# Patient Record
Sex: FEMALE | Race: BLACK OR AFRICAN AMERICAN | NOT HISPANIC OR LATINO | Employment: STUDENT | URBAN - METROPOLITAN AREA
[De-identification: names, ages, dates, MRNs, and addresses within clinical notes are randomized per-mention and may not be internally consistent; named-entity substitution may affect disease eponyms.]

---

## 2019-03-06 ENCOUNTER — OFFICE VISIT (OUTPATIENT)
Dept: INTERNAL MEDICINE | Age: 19
End: 2019-03-06

## 2019-03-06 ENCOUNTER — TELEPHONE (OUTPATIENT)
Dept: SCHEDULING | Age: 19
End: 2019-03-06

## 2019-03-06 DIAGNOSIS — H57.89 IRRITATION OF LEFT EYE: Primary | ICD-10-CM

## 2019-03-06 PROBLEM — Z00.00 PHYSICAL EXAM, ROUTINE: Status: ACTIVE | Noted: 2019-03-06

## 2019-03-06 PROCEDURE — 99203 OFFICE O/P NEW LOW 30 MIN: CPT | Performed by: INTERNAL MEDICINE

## 2019-03-06 SDOH — HEALTH STABILITY: MENTAL HEALTH: HOW OFTEN DO YOU HAVE A DRINK CONTAINING ALCOHOL?: NEVER

## 2019-03-06 ASSESSMENT — ENCOUNTER SYMPTOMS
COUGH: 0
BRUISES/BLEEDS EASILY: 0
WOUND: 0
FEVER: 0
EYE PAIN: 0
POLYDIPSIA: 0
FATIGUE: 0
UNEXPECTED WEIGHT CHANGE: 0
SLEEP DISTURBANCE: 0
DIARRHEA: 0
APPETITE CHANGE: 0
CONSTIPATION: 0
VOMITING: 0
DIZZINESS: 0
NAUSEA: 0
BLOOD IN STOOL: 0
HEADACHES: 0
SHORTNESS OF BREATH: 0
ABDOMINAL PAIN: 0
BACK PAIN: 0
NERVOUS/ANXIOUS: 0
RHINORRHEA: 0

## 2019-03-06 ASSESSMENT — PAIN SCALES - GENERAL: PAINLEVEL: 0

## 2019-08-30 ENCOUNTER — HOSPITAL (OUTPATIENT)
Dept: OTHER | Age: 19
End: 2019-08-30
Attending: EMERGENCY MEDICINE

## 2019-08-30 ENCOUNTER — TELEPHONE (OUTPATIENT)
Dept: SCHEDULING | Age: 19
End: 2019-08-30

## 2019-08-30 LAB
ANALYZER ANC (IANC): ABNORMAL
BASO+EOS+MONOS # BLD: 0.4 K/MCL (ref 0.1–1.1)
BASO+EOS+MONOS NFR BLD: 11 %
DIFFERENTIAL METHOD BLD: ABNORMAL
ERYTHROCYTE [DISTWIDTH] IN BLOOD: 11.9 % (ref 11–15)
HCT VFR BLD CALC: 35.8 % (ref 36–46.5)
HETEROPH AB SERPL QL IA: POSITIVE
HGB BLD-MCNC: 12.2 G/DL (ref 12–15.5)
LYMPHOCYTES # BLD: 1.4 K/MCL (ref 1.2–5.2)
LYMPHOCYTES NFR BLD: 40 %
MCH RBC QN AUTO: 27.6 PG (ref 26–34)
MCHC RBC AUTO-ENTMCNC: 34.1 G/DL (ref 32–36.5)
MCV RBC AUTO: 81 FL (ref 78–100)
NEUTROPHILS # BLD: 1.8 K/MCL (ref 1.8–8)
NEUTROPHILS NFR BLD: 49 %
NEUTS SEG NFR BLD: ABNORMAL %
NRBC (NRBCRE): ABNORMAL
PLATELET # BLD: 187 K/MCL (ref 140–450)
RBC # BLD: 4.42 MIL/MCL (ref 4–5.2)
WBC # BLD: 3.6 K/MCL (ref 4.2–11)

## 2019-09-02 ENCOUNTER — TELEPHONE (OUTPATIENT)
Dept: SCHEDULING | Age: 19
End: 2019-09-02

## 2019-09-04 ENCOUNTER — OFFICE VISIT (OUTPATIENT)
Dept: INTERNAL MEDICINE | Age: 19
End: 2019-09-04

## 2019-09-04 DIAGNOSIS — B34.9 VIRAL SYNDROME: Primary | ICD-10-CM

## 2019-09-04 PROBLEM — Z00.00 PHYSICAL EXAM, ROUTINE: Status: RESOLVED | Noted: 2019-03-06 | Resolved: 2019-09-04

## 2019-09-04 PROBLEM — H57.89 IRRITATION OF LEFT EYE: Status: RESOLVED | Noted: 2019-03-06 | Resolved: 2019-09-04

## 2019-09-04 PROCEDURE — 99213 OFFICE O/P EST LOW 20 MIN: CPT | Performed by: INTERNAL MEDICINE

## 2019-09-04 RX ORDER — NORGESTIMATE AND ETHINYL ESTRADIOL 7DAYSX3 LO
1 KIT ORAL DAILY
COMMUNITY
End: 2021-03-24 | Stop reason: ALTCHOICE

## 2019-09-04 RX ORDER — ADAPALENE GEL USP, 0.3% 3 MG/G
1 GEL TOPICAL EVERY OTHER DAY
COMMUNITY
Start: 2019-07-31 | End: 2021-03-24 | Stop reason: ALTCHOICE

## 2019-09-04 ASSESSMENT — ENCOUNTER SYMPTOMS
FATIGUE: 0
SLEEP DISTURBANCE: 0
HEADACHES: 0
UNEXPECTED WEIGHT CHANGE: 0
APPETITE CHANGE: 0
FEVER: 0
NERVOUS/ANXIOUS: 0
COUGH: 0
CHILLS: 0
DIAPHORESIS: 0
DIZZINESS: 0
ACTIVITY CHANGE: 0
SHORTNESS OF BREATH: 0

## 2019-09-04 ASSESSMENT — PAIN SCALES - GENERAL: PAINLEVEL: 0

## 2019-09-05 ENCOUNTER — APPOINTMENT (OUTPATIENT)
Dept: INTERNAL MEDICINE | Age: 19
End: 2019-09-05

## 2020-01-22 ENCOUNTER — TELEPHONE (OUTPATIENT)
Dept: SCHEDULING | Age: 20
End: 2020-01-22

## 2020-01-22 ENCOUNTER — OFFICE VISIT (OUTPATIENT)
Dept: INTERNAL MEDICINE | Age: 20
End: 2020-01-22

## 2020-01-22 DIAGNOSIS — R63.4 WEIGHT LOSS, NON-INTENTIONAL: Primary | ICD-10-CM

## 2020-01-22 PROCEDURE — 99214 OFFICE O/P EST MOD 30 MIN: CPT | Performed by: INTERNAL MEDICINE

## 2020-01-22 ASSESSMENT — ENCOUNTER SYMPTOMS
ADENOPATHY: 0
UNEXPECTED WEIGHT CHANGE: 1
TREMORS: 0
CHILLS: 0
ACTIVITY CHANGE: 0
SINUS PAIN: 0
LIGHT-HEADEDNESS: 0
DIZZINESS: 0
SHORTNESS OF BREATH: 0
NAUSEA: 0
AGITATION: 0
FEVER: 0
POLYDIPSIA: 0
BACK PAIN: 0
FATIGUE: 0
COLOR CHANGE: 0
ABDOMINAL PAIN: 0
BLOOD IN STOOL: 0
APPETITE CHANGE: 1
COUGH: 0
DIARRHEA: 0

## 2020-01-22 ASSESSMENT — PATIENT HEALTH QUESTIONNAIRE - PHQ9
2. FEELING DOWN, DEPRESSED OR HOPELESS: NOT AT ALL
1. LITTLE INTEREST OR PLEASURE IN DOING THINGS: NOT AT ALL
2. FEELING DOWN, DEPRESSED OR HOPELESS: NOT AT ALL
1. LITTLE INTEREST OR PLEASURE IN DOING THINGS: NOT AT ALL
SUM OF ALL RESPONSES TO PHQ9 QUESTIONS 1 AND 2: 0

## 2020-01-22 ASSESSMENT — PAIN SCALES - GENERAL: PAINLEVEL: 0

## 2020-01-24 ENCOUNTER — LAB SERVICES (OUTPATIENT)
Dept: LAB | Age: 20
End: 2020-01-24

## 2020-01-24 DIAGNOSIS — R63.4 WEIGHT LOSS, NON-INTENTIONAL: ICD-10-CM

## 2020-01-24 LAB
ALBUMIN SERPL-MCNC: 3.9 G/DL (ref 3.6–5.1)
ALBUMIN/GLOB SERPL: 1 {RATIO} (ref 1–2.4)
ALP SERPL-CCNC: 58 UNITS/L (ref 45–117)
ALT SERPL-CCNC: 16 UNITS/L
ANION GAP SERPL CALC-SCNC: 11 MMOL/L (ref 10–20)
AST SERPL-CCNC: 11 UNITS/L
BASOPHILS # BLD AUTO: 0 K/MCL (ref 0–0.3)
BASOPHILS NFR BLD AUTO: 1 %
BILIRUB SERPL-MCNC: 0.5 MG/DL (ref 0.2–1)
BUN SERPL-MCNC: 10 MG/DL (ref 6–20)
BUN/CREAT SERPL: 13 (ref 7–25)
CALCIUM SERPL-MCNC: 9.3 MG/DL (ref 8.4–10.2)
CHLORIDE SERPL-SCNC: 107 MMOL/L (ref 98–107)
CO2 SERPL-SCNC: 26 MMOL/L (ref 21–32)
CREAT SERPL-MCNC: 0.8 MG/DL (ref 0.51–0.95)
DIFFERENTIAL METHOD BLD: ABNORMAL
EOSINOPHIL # BLD AUTO: 0.4 K/MCL (ref 0.1–0.5)
EOSINOPHIL NFR SPEC: 8 %
ERYTHROCYTE [DISTWIDTH] IN BLOOD: 12.6 % (ref 11–15)
FASTING STATUS PATIENT QL REPORTED: 12 HRS
GLOBULIN SER-MCNC: 4.1 G/DL (ref 2–4)
GLUCOSE SERPL-MCNC: 89 MG/DL (ref 65–99)
HCT VFR BLD CALC: 41 % (ref 36–46.5)
HGB BLD-MCNC: 13 G/DL (ref 12–15.5)
IMM GRANULOCYTES # BLD AUTO: 0 K/MCL (ref 0–0.2)
IMM GRANULOCYTES NFR BLD: 0 %
LYMPHOCYTES # BLD MANUAL: 2.2 K/MCL (ref 1.2–5.2)
LYMPHOCYTES NFR BLD MANUAL: 49 %
MCH RBC QN AUTO: 26.9 PG (ref 26–34)
MCHC RBC AUTO-ENTMCNC: 31.7 G/DL (ref 32–36.5)
MCV RBC AUTO: 84.7 FL (ref 78–100)
MONOCYTES # BLD MANUAL: 0.4 K/MCL (ref 0.3–0.9)
MONOCYTES NFR BLD MANUAL: 9 %
NEUTROPHILS # BLD: 1.4 K/MCL (ref 1.8–8)
NEUTROPHILS NFR BLD AUTO: 33 %
NRBC BLD MANUAL-RTO: 0 /100 WBC
PLATELET # BLD: 221 K/MCL (ref 140–450)
POTASSIUM SERPL-SCNC: 4.2 MMOL/L (ref 3.4–5.1)
PROT SERPL-MCNC: 8 G/DL (ref 6.4–8.2)
RBC # BLD: 4.84 MIL/MCL (ref 4–5.2)
SODIUM SERPL-SCNC: 140 MMOL/L (ref 135–145)
T3 SERPL-MCNC: 1.33 NG/ML (ref 0.86–1.92)
TSH SERPL-ACNC: 1.06 MCUNITS/ML (ref 0.46–4.13)
WBC # BLD: 4.4 K/MCL (ref 4.2–11)

## 2020-01-24 PROCEDURE — 84480 ASSAY TRIIODOTHYRONINE (T3): CPT | Performed by: INTERNAL MEDICINE

## 2020-01-24 PROCEDURE — 80050 GENERAL HEALTH PANEL: CPT | Performed by: INTERNAL MEDICINE

## 2020-01-24 PROCEDURE — 36415 COLL VENOUS BLD VENIPUNCTURE: CPT | Performed by: INTERNAL MEDICINE

## 2020-02-14 ENCOUNTER — OFFICE VISIT (OUTPATIENT)
Dept: INTERNAL MEDICINE | Age: 20
End: 2020-02-14

## 2020-02-14 ENCOUNTER — TELEPHONE (OUTPATIENT)
Dept: SCHEDULING | Age: 20
End: 2020-02-14

## 2020-02-14 DIAGNOSIS — G44.89 OTHER HEADACHE SYNDROME: ICD-10-CM

## 2020-02-14 DIAGNOSIS — J01.10 ACUTE FRONTAL SINUSITIS, RECURRENCE NOT SPECIFIED: Primary | ICD-10-CM

## 2020-02-14 LAB
FLUAV AG UPPER RESP QL IA.RAPID: NEGATIVE
FLUBV AG UPPER RESP QL IA.RAPID: NEGATIVE

## 2020-02-14 PROCEDURE — 87804 INFLUENZA ASSAY W/OPTIC: CPT | Performed by: INTERNAL MEDICINE

## 2020-02-14 PROCEDURE — 99214 OFFICE O/P EST MOD 30 MIN: CPT | Performed by: INTERNAL MEDICINE

## 2020-02-14 RX ORDER — FLUTICASONE PROPIONATE 50 MCG
2 SPRAY, SUSPENSION (ML) NASAL DAILY
Qty: 16 G | Refills: 0 | Status: SHIPPED | OUTPATIENT
Start: 2020-02-14 | End: 2021-03-24 | Stop reason: ALTCHOICE

## 2020-02-14 RX ORDER — AZITHROMYCIN 250 MG/1
TABLET, FILM COATED ORAL
Qty: 6 TABLET | Refills: 0 | Status: SHIPPED | OUTPATIENT
Start: 2020-02-14 | End: 2021-03-24 | Stop reason: ALTCHOICE

## 2020-02-14 RX ORDER — LORATADINE 10 MG/1
10 TABLET ORAL DAILY
Qty: 5 TABLET | Refills: 0 | Status: SHIPPED | OUTPATIENT
Start: 2020-02-14 | End: 2021-03-24 | Stop reason: ALTCHOICE

## 2020-02-14 SDOH — HEALTH STABILITY: MENTAL HEALTH: HOW OFTEN DO YOU HAVE A DRINK CONTAINING ALCOHOL?: NEVER

## 2020-02-14 ASSESSMENT — PAIN SCALES - GENERAL: PAINLEVEL: 0

## 2020-02-15 ASSESSMENT — ENCOUNTER SYMPTOMS
NAUSEA: 0
WEAKNESS: 0
TROUBLE SWALLOWING: 0
EYE DISCHARGE: 0
EYE PAIN: 0
COLOR CHANGE: 0
SINUS PRESSURE: 0
NUMBNESS: 0
EYE REDNESS: 0
FEVER: 0
CONSTIPATION: 0
SORE THROAT: 0
DIARRHEA: 0
ABDOMINAL DISTENTION: 0
ABDOMINAL PAIN: 0
APPETITE CHANGE: 0
WHEEZING: 0
LIGHT-HEADEDNESS: 0
BLOOD IN STOOL: 0
UNEXPECTED WEIGHT CHANGE: 0
NERVOUS/ANXIOUS: 0
HEADACHES: 1
SHORTNESS OF BREATH: 0
COUGH: 0
DIZZINESS: 0
FATIGUE: 1
CHEST TIGHTNESS: 0
BACK PAIN: 0

## 2020-04-22 ENCOUNTER — TELEPHONE (OUTPATIENT)
Dept: SCHEDULING | Age: 20
End: 2020-04-22

## 2020-06-18 ENCOUNTER — TELEPHONE (OUTPATIENT)
Dept: SCHEDULING | Age: 20
End: 2020-06-18

## 2020-06-19 ENCOUNTER — OFFICE VISIT (OUTPATIENT)
Dept: INTERNAL MEDICINE | Age: 20
End: 2020-06-19

## 2020-06-19 ENCOUNTER — TELEPHONE (OUTPATIENT)
Dept: SCHEDULING | Age: 20
End: 2020-06-19

## 2020-06-19 DIAGNOSIS — L50.9 URTICARIA: Primary | ICD-10-CM

## 2020-06-19 PROCEDURE — 99213 OFFICE O/P EST LOW 20 MIN: CPT | Performed by: INTERNAL MEDICINE

## 2020-06-19 ASSESSMENT — PATIENT HEALTH QUESTIONNAIRE - PHQ9
CLINICAL INTERPRETATION OF PHQ9 SCORE: NO FURTHER SCREENING NEEDED
SUM OF ALL RESPONSES TO PHQ9 QUESTIONS 1 AND 2: 0
2. FEELING DOWN, DEPRESSED OR HOPELESS: NOT AT ALL
1. LITTLE INTEREST OR PLEASURE IN DOING THINGS: NOT AT ALL
SUM OF ALL RESPONSES TO PHQ9 QUESTIONS 1 AND 2: 0
CLINICAL INTERPRETATION OF PHQ2 SCORE: NO FURTHER SCREENING NEEDED

## 2021-01-13 LAB
CYTOLOGY CVX/VAG DOC THIN PREP: NORMAL
HPV16+18+45 E6+E7MRNA CVX NAA+PROBE: NEGATIVE

## 2021-03-23 ENCOUNTER — TELEPHONE (OUTPATIENT)
Dept: SCHEDULING | Age: 21
End: 2021-03-23

## 2021-03-24 ENCOUNTER — DOCUMENTATION (OUTPATIENT)
Dept: INTERNAL MEDICINE | Age: 21
End: 2021-03-24

## 2021-03-24 ENCOUNTER — OFFICE VISIT (OUTPATIENT)
Dept: INTERNAL MEDICINE | Age: 21
End: 2021-03-24

## 2021-03-24 VITALS
SYSTOLIC BLOOD PRESSURE: 122 MMHG | WEIGHT: 162 LBS | OXYGEN SATURATION: 100 % | RESPIRATION RATE: 16 BRPM | HEART RATE: 121 BPM | TEMPERATURE: 98.3 F | HEIGHT: 69 IN | DIASTOLIC BLOOD PRESSURE: 82 MMHG | BODY MASS INDEX: 23.99 KG/M2

## 2021-03-24 DIAGNOSIS — Z00.00 ANNUAL PHYSICAL EXAM: Primary | ICD-10-CM

## 2021-03-24 PROBLEM — L50.9 URTICARIA: Status: RESOLVED | Noted: 2020-06-19 | Resolved: 2021-03-24

## 2021-03-24 PROCEDURE — 99395 PREV VISIT EST AGE 18-39: CPT | Performed by: INTERNAL MEDICINE

## 2021-03-24 ASSESSMENT — ENCOUNTER SYMPTOMS
HEADACHES: 0
SHORTNESS OF BREATH: 0
DIZZINESS: 0
COUGH: 0
DIAPHORESIS: 0
NERVOUS/ANXIOUS: 0
SLEEP DISTURBANCE: 0
ACTIVITY CHANGE: 0
UNEXPECTED WEIGHT CHANGE: 0
FEVER: 0
APPETITE CHANGE: 0
CHILLS: 0
FATIGUE: 0

## 2021-05-26 VITALS
SYSTOLIC BLOOD PRESSURE: 98 MMHG | DIASTOLIC BLOOD PRESSURE: 72 MMHG | WEIGHT: 130.84 LBS | BODY MASS INDEX: 18.62 KG/M2 | HEIGHT: 70 IN | TEMPERATURE: 97.9 F | SYSTOLIC BLOOD PRESSURE: 109 MMHG | BODY MASS INDEX: 21.46 KG/M2 | RESPIRATION RATE: 17 BRPM | WEIGHT: 130.07 LBS | RESPIRATION RATE: 16 BRPM | TEMPERATURE: 98.2 F | WEIGHT: 149.91 LBS | HEIGHT: 70 IN | SYSTOLIC BLOOD PRESSURE: 108 MMHG | SYSTOLIC BLOOD PRESSURE: 109 MMHG | HEART RATE: 81 BPM | OXYGEN SATURATION: 94 % | HEART RATE: 84 BPM | BODY MASS INDEX: 19.38 KG/M2 | DIASTOLIC BLOOD PRESSURE: 57 MMHG | TEMPERATURE: 98 F | HEIGHT: 69 IN | DIASTOLIC BLOOD PRESSURE: 72 MMHG | OXYGEN SATURATION: 98 % | HEART RATE: 97 BPM | OXYGEN SATURATION: 97 % | BODY MASS INDEX: 20.52 KG/M2 | TEMPERATURE: 97.8 F | HEART RATE: 75 BPM | HEIGHT: 70 IN | DIASTOLIC BLOOD PRESSURE: 74 MMHG | OXYGEN SATURATION: 98 % | WEIGHT: 143.3 LBS | RESPIRATION RATE: 17 BRPM | RESPIRATION RATE: 17 BRPM

## 2021-07-14 ENCOUNTER — OFFICE VISIT (OUTPATIENT)
Dept: INTERNAL MEDICINE | Age: 21
End: 2021-07-14

## 2021-07-14 ENCOUNTER — LAB SERVICES (OUTPATIENT)
Dept: LAB | Age: 21
End: 2021-07-14

## 2021-07-14 VITALS
OXYGEN SATURATION: 100 % | TEMPERATURE: 98.1 F | WEIGHT: 163 LBS | SYSTOLIC BLOOD PRESSURE: 122 MMHG | HEART RATE: 98 BPM | RESPIRATION RATE: 19 BRPM | BODY MASS INDEX: 24.14 KG/M2 | DIASTOLIC BLOOD PRESSURE: 78 MMHG | HEIGHT: 69 IN

## 2021-07-14 DIAGNOSIS — B37.31 VAGINAL CANDIDIASIS: Primary | ICD-10-CM

## 2021-07-14 DIAGNOSIS — B37.31 VAGINAL CANDIDIASIS: ICD-10-CM

## 2021-07-14 LAB
APPEARANCE UR: NORMAL
BILIRUB UR QL STRIP: NEGATIVE
COLOR UR: YELLOW
GLUCOSE UR STRIP-MCNC: NEGATIVE MG/DL
HGB UR QL STRIP: NEGATIVE
KETONES UR STRIP-MCNC: NEGATIVE MG/DL
LEUKOCYTE ESTERASE UR QL STRIP: NEGATIVE
NITRITE UR QL STRIP: NEGATIVE
PH UR STRIP: 6 [PH] (ref 5–7)
PROT UR STRIP-MCNC: NEGATIVE MG/DL
SP GR UR STRIP: 1.02 (ref 1–1.03)
UROBILINOGEN UR STRIP-MCNC: 0.2 MG/DL

## 2021-07-14 PROCEDURE — 99214 OFFICE O/P EST MOD 30 MIN: CPT | Performed by: INTERNAL MEDICINE

## 2021-07-14 PROCEDURE — 81003 URINALYSIS AUTO W/O SCOPE: CPT | Performed by: INTERNAL MEDICINE

## 2021-07-14 ASSESSMENT — PATIENT HEALTH QUESTIONNAIRE - PHQ9
SUM OF ALL RESPONSES TO PHQ9 QUESTIONS 1 AND 2: 0
2. FEELING DOWN, DEPRESSED OR HOPELESS: NOT AT ALL
CLINICAL INTERPRETATION OF PHQ9 SCORE: NO FURTHER SCREENING NEEDED
SUM OF ALL RESPONSES TO PHQ9 QUESTIONS 1 AND 2: 0
CLINICAL INTERPRETATION OF PHQ2 SCORE: NO FURTHER SCREENING NEEDED
1. LITTLE INTEREST OR PLEASURE IN DOING THINGS: NOT AT ALL

## 2021-07-14 ASSESSMENT — ENCOUNTER SYMPTOMS
FEVER: 0
BACK PAIN: 0

## 2021-07-14 ASSESSMENT — PAIN SCALES - GENERAL: PAINLEVEL: 0

## 2021-08-26 ENCOUNTER — OFFICE VISIT (OUTPATIENT)
Dept: INTERNAL MEDICINE | Age: 21
End: 2021-08-26

## 2021-08-26 VITALS
DIASTOLIC BLOOD PRESSURE: 77 MMHG | TEMPERATURE: 98.1 F | WEIGHT: 166 LBS | HEART RATE: 99 BPM | HEIGHT: 69 IN | OXYGEN SATURATION: 99 % | BODY MASS INDEX: 24.59 KG/M2 | SYSTOLIC BLOOD PRESSURE: 120 MMHG | RESPIRATION RATE: 16 BRPM

## 2021-08-26 DIAGNOSIS — N39.0 ACUTE UTI (URINARY TRACT INFECTION): Primary | ICD-10-CM

## 2021-08-26 LAB
APPEARANCE, POC: CLEAR
B-HCG UR QL: NEGATIVE
BILIRUBIN, POC: NEGATIVE
COLOR, POC: YELLOW
GLUCOSE UR-MCNC: NEGATIVE MG/DL
INTERNAL PROCEDURAL CONTROLS ACCEPTABLE: NO
KETONES, POC: NEGATIVE MG/DL
NITRITE, POC: NEGATIVE
OCCULT BLOOD, POC: NEGATIVE
PH UR: 6 [PH] (ref 5–7)
PROT UR-MCNC: NEGATIVE MG/DL
SP GR UR: 1.03 (ref 1–1.03)
UROBILINOGEN UR-MCNC: 0.2 MG/DL (ref 0–1)
WBC (LEUKOCYTE) ESTERASE, POC: NEGATIVE

## 2021-08-26 PROCEDURE — 81025 URINE PREGNANCY TEST: CPT | Performed by: INTERNAL MEDICINE

## 2021-08-26 PROCEDURE — 81003 URINALYSIS AUTO W/O SCOPE: CPT | Performed by: INTERNAL MEDICINE

## 2021-08-26 PROCEDURE — 87086 URINE CULTURE/COLONY COUNT: CPT | Performed by: INTERNAL MEDICINE

## 2021-08-26 PROCEDURE — 99214 OFFICE O/P EST MOD 30 MIN: CPT | Performed by: INTERNAL MEDICINE

## 2021-08-26 RX ORDER — NITROFURANTOIN 25; 75 MG/1; MG/1
100 CAPSULE ORAL 2 TIMES DAILY
Qty: 14 CAPSULE | Refills: 0 | Status: SHIPPED | OUTPATIENT
Start: 2021-08-26 | End: 2021-09-02

## 2021-08-26 ASSESSMENT — ENCOUNTER SYMPTOMS
FEVER: 0
WEAKNESS: 0
COLOR CHANGE: 0
DIARRHEA: 0
LIGHT-HEADEDNESS: 0
NUMBNESS: 0
SORE THROAT: 0
CONSTIPATION: 0
ABDOMINAL PAIN: 0
HEADACHES: 0
FATIGUE: 0
NERVOUS/ANXIOUS: 0
DIZZINESS: 0
NAUSEA: 0
EYE PAIN: 0
APPETITE CHANGE: 0
COUGH: 0
TROUBLE SWALLOWING: 0
CHEST TIGHTNESS: 0
EYE REDNESS: 0
BLOOD IN STOOL: 0
SINUS PRESSURE: 0
ABDOMINAL DISTENTION: 0
EYE DISCHARGE: 0
UNEXPECTED WEIGHT CHANGE: 0
BACK PAIN: 0
WHEEZING: 0
SHORTNESS OF BREATH: 0

## 2021-08-28 LAB — BACTERIA UR CULT: NORMAL

## 2022-05-18 ENCOUNTER — OFFICE VISIT (OUTPATIENT)
Dept: INTERNAL MEDICINE | Age: 22
End: 2022-05-18

## 2022-05-18 ENCOUNTER — TELEPHONE (OUTPATIENT)
Dept: SCHEDULING | Age: 22
End: 2022-05-18

## 2022-05-18 VITALS
TEMPERATURE: 98.4 F | BODY MASS INDEX: 24.88 KG/M2 | HEIGHT: 69 IN | DIASTOLIC BLOOD PRESSURE: 85 MMHG | WEIGHT: 168 LBS | OXYGEN SATURATION: 99 % | HEART RATE: 90 BPM | SYSTOLIC BLOOD PRESSURE: 124 MMHG | RESPIRATION RATE: 16 BRPM

## 2022-05-18 DIAGNOSIS — J20.9 ACUTE BRONCHITIS, UNSPECIFIED ORGANISM: Primary | ICD-10-CM

## 2022-05-18 PROCEDURE — 99214 OFFICE O/P EST MOD 30 MIN: CPT | Performed by: INTERNAL MEDICINE

## 2022-05-18 PROCEDURE — 0240U SARS-COV-2/INFLUENZA BY PCR: CPT | Performed by: INTERNAL MEDICINE

## 2022-05-18 RX ORDER — LORATADINE 10 MG/1
10 TABLET ORAL DAILY
Qty: 5 TABLET | Refills: 0 | Status: SHIPPED | OUTPATIENT
Start: 2022-05-18 | End: 2023-05-25 | Stop reason: ALTCHOICE

## 2022-05-18 RX ORDER — FLUTICASONE PROPIONATE 50 MCG
2 SPRAY, SUSPENSION (ML) NASAL DAILY
Qty: 16 G | Refills: 0 | Status: SHIPPED | OUTPATIENT
Start: 2022-05-18 | End: 2023-05-25 | Stop reason: ALTCHOICE

## 2022-05-18 ASSESSMENT — ENCOUNTER SYMPTOMS
EYE PAIN: 0
WEAKNESS: 0
WHEEZING: 0
FEVER: 0
SHORTNESS OF BREATH: 0
EYE REDNESS: 0
BACK PAIN: 0
ABDOMINAL PAIN: 0
ABDOMINAL DISTENTION: 0
CHEST TIGHTNESS: 0
NAUSEA: 0
TROUBLE SWALLOWING: 0
HEADACHES: 0
SORE THROAT: 0
COLOR CHANGE: 0
SINUS PRESSURE: 1
BLOOD IN STOOL: 0
DIZZINESS: 0
DIARRHEA: 0
UNEXPECTED WEIGHT CHANGE: 0
NERVOUS/ANXIOUS: 0
EYE DISCHARGE: 0
APPETITE CHANGE: 0
CONSTIPATION: 0
FATIGUE: 0
LIGHT-HEADEDNESS: 0
NUMBNESS: 0
COUGH: 1

## 2022-05-19 LAB
FLUAV RNA RESP QL NAA+PROBE: NOT DETECTED
FLUBV RNA RESP QL NAA+PROBE: NOT DETECTED
SARS-COV-2 RNA RESP QL NAA+PROBE: NOT DETECTED
SERVICE CMNT-IMP: NORMAL
SERVICE CMNT-IMP: NORMAL

## 2023-05-19 LAB
HBV SURFACE AG SER QL: NEGATIVE
RPR SER QL: NON REACTIVE

## 2023-05-22 ENCOUNTER — TELEPHONE (OUTPATIENT)
Dept: MATERNAL FETAL MEDICINE | Age: 23
End: 2023-05-22

## 2023-05-22 ENCOUNTER — CLINICAL ABSTRACT (OUTPATIENT)
Dept: MATERNAL FETAL MEDICINE | Age: 23
End: 2023-05-22

## 2023-05-22 DIAGNOSIS — O09.30 LATE PRENATAL CARE: Primary | ICD-10-CM

## 2023-05-22 DIAGNOSIS — R73.09 ELEVATED GLUCOSE: Primary | ICD-10-CM

## 2023-05-22 LAB — CYTOLOGY CVX/VAG DOC THIN PREP: NORMAL

## 2023-05-23 ENCOUNTER — OFFICE VISIT (OUTPATIENT)
Dept: MATERNAL FETAL MEDICINE | Age: 23
End: 2023-05-23
Attending: OBSTETRICS & GYNECOLOGY

## 2023-05-23 ENCOUNTER — OB CHECK (OUTPATIENT)
Dept: MATERNAL FETAL MEDICINE | Age: 23
End: 2023-05-23
Attending: OBSTETRICS & GYNECOLOGY

## 2023-05-23 DIAGNOSIS — O40.3XX0 POLYHYDRAMNIOS IN THIRD TRIMESTER COMPLICATION, SINGLE OR UNSPECIFIED FETUS: Primary | ICD-10-CM

## 2023-05-23 DIAGNOSIS — O09.30 LATE PRENATAL CARE: ICD-10-CM

## 2023-05-23 PROCEDURE — 59025 FETAL NON-STRESS TEST: CPT | Performed by: OBSTETRICS & GYNECOLOGY

## 2023-05-23 PROCEDURE — 76816 OB US FOLLOW-UP PER FETUS: CPT

## 2023-05-23 PROCEDURE — 76816 OB US FOLLOW-UP PER FETUS: CPT | Performed by: OBSTETRICS & GYNECOLOGY

## 2023-05-25 ENCOUNTER — LAB SERVICES (OUTPATIENT)
Dept: LAB | Age: 23
End: 2023-05-25

## 2023-05-25 ENCOUNTER — OFFICE VISIT (OUTPATIENT)
Dept: INTERNAL MEDICINE | Age: 23
End: 2023-05-25

## 2023-05-25 VITALS
RESPIRATION RATE: 16 BRPM | BODY MASS INDEX: 28.13 KG/M2 | SYSTOLIC BLOOD PRESSURE: 117 MMHG | HEART RATE: 106 BPM | HEIGHT: 69 IN | DIASTOLIC BLOOD PRESSURE: 84 MMHG | WEIGHT: 189.9 LBS

## 2023-05-25 DIAGNOSIS — Z11.1 SCREENING FOR TUBERCULOSIS: ICD-10-CM

## 2023-05-25 DIAGNOSIS — Z01.84 IMMUNITY STATUS TESTING: Primary | ICD-10-CM

## 2023-05-25 DIAGNOSIS — Z01.84 IMMUNITY STATUS TESTING: ICD-10-CM

## 2023-05-25 PROBLEM — Z3A.36 36 WEEKS GESTATION OF PREGNANCY: Status: ACTIVE | Noted: 2023-05-25

## 2023-05-25 LAB
HBV SURFACE AB SER QL: POSITIVE
RUBV IGG SERPL IA-ACNC: 118.9 UNITS/ML

## 2023-05-25 PROCEDURE — 86787 VARICELLA-ZOSTER ANTIBODY: CPT | Performed by: INTERNAL MEDICINE

## 2023-05-25 PROCEDURE — 86580 TB INTRADERMAL TEST: CPT | Performed by: INTERNAL MEDICINE

## 2023-05-25 PROCEDURE — 86765 RUBEOLA ANTIBODY: CPT | Performed by: INTERNAL MEDICINE

## 2023-05-25 PROCEDURE — 36415 COLL VENOUS BLD VENIPUNCTURE: CPT | Performed by: INTERNAL MEDICINE

## 2023-05-25 PROCEDURE — 99395 PREV VISIT EST AGE 18-39: CPT | Performed by: INTERNAL MEDICINE

## 2023-05-25 PROCEDURE — 86735 MUMPS ANTIBODY: CPT | Performed by: INTERNAL MEDICINE

## 2023-05-25 PROCEDURE — 86762 RUBELLA ANTIBODY: CPT | Performed by: INTERNAL MEDICINE

## 2023-05-25 PROCEDURE — 86706 HEP B SURFACE ANTIBODY: CPT | Performed by: INTERNAL MEDICINE

## 2023-05-25 ASSESSMENT — ENCOUNTER SYMPTOMS
APPETITE CHANGE: 0
HEADACHES: 0
EYE PAIN: 0
BLOOD IN STOOL: 0
RHINORRHEA: 0
CONSTIPATION: 0
DIARRHEA: 0
COUGH: 0
SHORTNESS OF BREATH: 0
NERVOUS/ANXIOUS: 0
FATIGUE: 0
ABDOMINAL PAIN: 0
FEVER: 0
WOUND: 0
DIZZINESS: 0
VOMITING: 0
POLYDIPSIA: 0
UNEXPECTED WEIGHT CHANGE: 0
NAUSEA: 0
SLEEP DISTURBANCE: 0
BRUISES/BLEEDS EASILY: 0
BACK PAIN: 0

## 2023-05-26 ENCOUNTER — LAB SERVICES (OUTPATIENT)
Dept: LAB | Age: 23
End: 2023-05-26

## 2023-05-26 ENCOUNTER — LAB REQUISITION (OUTPATIENT)
Dept: LAB | Age: 23
End: 2023-05-26

## 2023-05-26 DIAGNOSIS — Z36.89 ENCOUNTER FOR OTHER SPECIFIED ANTENATAL SCREENING: ICD-10-CM

## 2023-05-26 DIAGNOSIS — O36.0990 MATERNAL CARE FOR OTHER RHESUS ISOIMMUNIZATION, UNSPECIFIED TRIMESTER, NOT APPLICABLE OR UNSPECIFIED: ICD-10-CM

## 2023-05-26 LAB
ABO + RH BLD: NORMAL
FETAL CELL SCN BLD QL ROSETTE: NEGATIVE
MEV IGG SER QL IA: NORMAL
MUV IGG SER IA-ACNC: 3.22 OD RATIO
VZV IGG SER IA-ACNC: NORMAL

## 2023-05-26 PROCEDURE — 86900 BLOOD TYPING SEROLOGIC ABO: CPT | Performed by: CLINICAL MEDICAL LABORATORY

## 2023-05-26 PROCEDURE — 86901 BLOOD TYPING SEROLOGIC RH(D): CPT | Performed by: CLINICAL MEDICAL LABORATORY

## 2023-05-26 PROCEDURE — 85461 HEMOGLOBIN FETAL: CPT | Performed by: CLINICAL MEDICAL LABORATORY

## 2023-05-26 PROCEDURE — 36415 COLL VENOUS BLD VENIPUNCTURE: CPT | Performed by: CLINICAL MEDICAL LABORATORY

## 2023-05-26 PROCEDURE — 87081 CULTURE SCREEN ONLY: CPT | Performed by: CLINICAL MEDICAL LABORATORY

## 2023-05-27 ENCOUNTER — HOSPITAL ENCOUNTER (OUTPATIENT)
Age: 23
Discharge: HOME | End: 2023-05-27
Attending: OBSTETRICS & GYNECOLOGY | Admitting: OBSTETRICS & GYNECOLOGY

## 2023-05-27 VITALS
OXYGEN SATURATION: 100 % | DIASTOLIC BLOOD PRESSURE: 89 MMHG | BODY MASS INDEX: 27.85 KG/M2 | SYSTOLIC BLOOD PRESSURE: 118 MMHG | WEIGHT: 188 LBS | HEART RATE: 97 BPM | RESPIRATION RATE: 16 BRPM | TEMPERATURE: 98.3 F | HEIGHT: 69 IN

## 2023-05-27 LAB
ALBUMIN SERPL-MCNC: 2.7 G/DL (ref 3.6–5.1)
ALBUMIN/GLOB SERPL: 0.6 {RATIO} (ref 1–2.4)
ALP SERPL-CCNC: 276 UNITS/L (ref 45–117)
ALT SERPL-CCNC: 29 UNITS/L
ANION GAP SERPL CALC-SCNC: 11 MMOL/L (ref 7–19)
AST SERPL-CCNC: 26 UNITS/L
BASOPHILS # BLD: 0 K/MCL (ref 0–0.3)
BASOPHILS NFR BLD: 0 %
BILIRUB SERPL-MCNC: 0.4 MG/DL (ref 0.2–1)
BUN SERPL-MCNC: 7 MG/DL (ref 6–20)
BUN/CREAT SERPL: 9 (ref 7–25)
CALCIUM SERPL-MCNC: 9.2 MG/DL (ref 8.4–10.2)
CHLORIDE SERPL-SCNC: 109 MMOL/L (ref 97–110)
CO2 SERPL-SCNC: 24 MMOL/L (ref 21–32)
CREAT SERPL-MCNC: 0.78 MG/DL (ref 0.51–0.95)
CREAT UR-MCNC: 45.5 MG/DL
DEPRECATED RDW RBC: 39.8 FL (ref 39–50)
EOSINOPHIL # BLD: 0.1 K/MCL (ref 0–0.5)
EOSINOPHIL NFR BLD: 2 %
ERYTHROCYTE [DISTWIDTH] IN BLOOD: 13.3 % (ref 11–15)
FASTING DURATION TIME PATIENT: ABNORMAL H
GFR SERPLBLD BASED ON 1.73 SQ M-ARVRAT: >90 ML/MIN
GLOBULIN SER-MCNC: 4.9 G/DL (ref 2–4)
GLUCOSE SERPL-MCNC: 95 MG/DL (ref 70–99)
HCT VFR BLD CALC: 31 % (ref 36–46.5)
HGB BLD-MCNC: 9.7 G/DL (ref 12–15.5)
IMM GRANULOCYTES # BLD AUTO: 0.1 K/MCL (ref 0–0.2)
IMM GRANULOCYTES # BLD: 1 %
LYMPHOCYTES # BLD: 1.7 K/MCL (ref 1–4.8)
LYMPHOCYTES NFR BLD: 26 %
MCH RBC QN AUTO: 25.7 PG (ref 26–34)
MCHC RBC AUTO-ENTMCNC: 31.3 G/DL (ref 32–36.5)
MCV RBC AUTO: 82 FL (ref 78–100)
MONOCYTES # BLD: 0.6 K/MCL (ref 0.3–0.9)
MONOCYTES NFR BLD: 9 %
NEUTROPHILS # BLD: 4.1 K/MCL (ref 1.8–7.7)
NEUTROPHILS NFR BLD: 62 %
NRBC BLD MANUAL-RTO: 0 /100 WBC
PLATELET # BLD AUTO: 168 K/MCL (ref 140–450)
POTASSIUM SERPL-SCNC: 3.9 MMOL/L (ref 3.4–5.1)
PROT SERPL-MCNC: 7.6 G/DL (ref 6.4–8.2)
PROT UR-MCNC: <6 MG/DL
PROT/CREAT UR: NORMAL MG/G{CREAT}
RBC # BLD: 3.78 MIL/MCL (ref 4–5.2)
SODIUM SERPL-SCNC: 140 MMOL/L (ref 135–145)
URATE SERPL-MCNC: 4.2 MG/DL (ref 2.6–5.9)
WBC # BLD: 6.6 K/MCL (ref 4.2–11)

## 2023-05-27 PROCEDURE — 10002800 HB RX 250 W HCPCS: Performed by: OBSTETRICS & GYNECOLOGY

## 2023-05-27 PROCEDURE — 36415 COLL VENOUS BLD VENIPUNCTURE: CPT | Performed by: OBSTETRICS & GYNECOLOGY

## 2023-05-27 PROCEDURE — 96372 THER/PROPH/DIAG INJ SC/IM: CPT

## 2023-05-27 PROCEDURE — 85025 COMPLETE CBC W/AUTO DIFF WBC: CPT | Performed by: OBSTETRICS & GYNECOLOGY

## 2023-05-27 PROCEDURE — 99212 OFFICE O/P EST SF 10 MIN: CPT

## 2023-05-27 PROCEDURE — 84156 ASSAY OF PROTEIN URINE: CPT | Performed by: OBSTETRICS & GYNECOLOGY

## 2023-05-27 PROCEDURE — 84550 ASSAY OF BLOOD/URIC ACID: CPT | Performed by: OBSTETRICS & GYNECOLOGY

## 2023-05-27 PROCEDURE — 80053 COMPREHEN METABOLIC PANEL: CPT | Performed by: OBSTETRICS & GYNECOLOGY

## 2023-05-27 PROCEDURE — 96372 THER/PROPH/DIAG INJ SC/IM: CPT | Performed by: OBSTETRICS & GYNECOLOGY

## 2023-05-27 RX ORDER — VITAMIN A ACETATE, BETA CAROTENE, ASCORBIC ACID, CHOLECALCIFEROL, .ALPHA.-TOCOPHEROL ACETATE, DL-, THIAMINE MONONITRATE, RIBOFLAVIN, NIACINAMIDE, PYRIDOXINE HYDROCHLORIDE, FOLIC ACID, CYANOCOBALAMIN, CALCIUM CARBONATE, FERROUS FUMARATE, ZINC OXIDE, CUPRIC OXIDE 3080; 12; 120; 400; 1; 1.84; 3; 20; 22; 920; 25; 200; 27; 10; 2 [IU]/1; UG/1; MG/1; [IU]/1; MG/1; MG/1; MG/1; MG/1; MG/1; [IU]/1; MG/1; MG/1; MG/1; MG/1; MG/1
1 TABLET, FILM COATED ORAL DAILY
COMMUNITY

## 2023-05-27 RX ORDER — PYRIDOXINE HCL (VITAMIN B6) 50 MG
50 TABLET ORAL DAILY
COMMUNITY

## 2023-05-27 RX ADMIN — HUMAN RHO(D) IMMUNE GLOBULIN 300 MCG: 1500 SOLUTION INTRAMUSCULAR; INTRAVENOUS at 09:42

## 2023-05-27 SDOH — ECONOMIC STABILITY: TRANSPORTATION INSECURITY
IN THE PAST 12 MONTHS, HAS THE LACK OF TRANSPORTATION KEPT YOU FROM MEDICAL APPOINTMENTS OR FROM GETTING MEDICATIONS?: NO

## 2023-05-27 SDOH — ECONOMIC STABILITY: FOOD INSECURITY: HOW OFTEN IN THE PAST 12 MONTHS WERE YOU WORRIED OR STRESSED ABOUT HAVING ENOUGH MONEY TO BUY NUTRITIOUS MEALS?: NEVER

## 2023-05-27 SDOH — HEALTH STABILITY: GENERAL
BECAUSE OF A PHYSICAL, MENTAL, OR EMOTIONAL CONDITION, DO YOU HAVE SERIOUS DIFFICULTY CONCENTRATING, REMEMBERING OR MAKING DECISIONS?: NO

## 2023-05-27 SDOH — SOCIAL STABILITY: SOCIAL NETWORK
HOW OFTEN DO YOU SEE OR TALK TO PEOPLE THAT YOU CARE ABOUT AND FEEL CLOSE TO? (FOR EXAMPLE: TALKING TO FRIENDS ON THE PHONE, VISITING FRIENDS OR FAMILY, GOING TO CHURCH OR CLUB MEETINGS): 3 TO 5 TIMES A WEEK

## 2023-05-27 SDOH — SOCIAL STABILITY: SOCIAL NETWORK: SUPPORT SYSTEMS: FAMILY MEMBERS

## 2023-05-27 SDOH — ECONOMIC STABILITY: HOUSING INSECURITY: WHAT IS YOUR LIVING SITUATION TODAY?: HOUSE

## 2023-05-27 SDOH — HEALTH STABILITY: GENERAL: BECAUSE OF A PHYSICAL, MENTAL, OR EMOTIONAL CONDITION, DO YOU HAVE DIFFICULTY DOING ERRANDS ALONE?: NO

## 2023-05-27 SDOH — ECONOMIC STABILITY: HOUSING INSECURITY: ARE YOU WORRIED ABOUT LOSING YOUR HOUSING?: NO

## 2023-05-27 SDOH — ECONOMIC STABILITY: GENERAL

## 2023-05-27 SDOH — HEALTH STABILITY: PHYSICAL HEALTH: DO YOU HAVE DIFFICULTY DRESSING OR BATHING?: NO

## 2023-05-27 SDOH — HEALTH STABILITY: PHYSICAL HEALTH: DO YOU HAVE SERIOUS DIFFICULTY WALKING OR CLIMBING STAIRS?: NO

## 2023-05-27 SDOH — ECONOMIC STABILITY: HOUSING INSECURITY: WHAT IS YOUR LIVING SITUATION TODAY?: PARENT

## 2023-05-27 SDOH — ECONOMIC STABILITY: TRANSPORTATION INSECURITY
IN THE PAST 12 MONTHS, HAS LACK OF TRANSPORTATION KEPT YOU FROM MEETINGS, WORK, OR FROM GETTING THINGS NEEDED FOR DAILY LIVING?: NO

## 2023-05-27 ASSESSMENT — ACTIVITIES OF DAILY LIVING (ADL)
ADL_BEFORE_ADMISSION: INDEPENDENT
ADL_SCORE: 12
RECENT_DECLINE_ADL: NO
ADL_SHORT_OF_BREATH: NO

## 2023-05-27 ASSESSMENT — LIFESTYLE VARIABLES
ALCOHOL_USE_STATUS: NO OR LOW RISK WITH VALIDATED TOOL
AUDIT-C TOTAL SCORE: 0
HOW OFTEN DO YOU HAVE 6 OR MORE DRINKS ON ONE OCCASION: NEVER
HOW OFTEN DO YOU HAVE A DRINK CONTAINING ALCOHOL: NEVER
HOW MANY STANDARD DRINKS CONTAINING ALCOHOL DO YOU HAVE ON A TYPICAL DAY: 0,1 OR 2

## 2023-05-27 ASSESSMENT — PAIN SCALES - GENERAL: PAINLEVEL_OUTOF10: 0

## 2023-05-29 LAB — GP B STREP SPEC QL CULT: NORMAL

## 2023-05-31 ENCOUNTER — OFFICE VISIT (OUTPATIENT)
Dept: INTERNAL MEDICINE | Age: 23
End: 2023-05-31

## 2023-05-31 VITALS
SYSTOLIC BLOOD PRESSURE: 121 MMHG | BODY MASS INDEX: 28.39 KG/M2 | WEIGHT: 191.7 LBS | HEART RATE: 108 BPM | DIASTOLIC BLOOD PRESSURE: 91 MMHG | HEIGHT: 69 IN | RESPIRATION RATE: 16 BRPM

## 2023-05-31 DIAGNOSIS — Z11.1 SCREENING FOR TUBERCULOSIS: Primary | ICD-10-CM

## 2023-05-31 LAB
INDURATION: 0 MM (ref 0–?)
SKIN TEST RESULT: NEGATIVE

## 2023-05-31 PROCEDURE — 86580 TB INTRADERMAL TEST: CPT | Performed by: INTERNAL MEDICINE

## 2023-05-31 PROCEDURE — 99213 OFFICE O/P EST LOW 20 MIN: CPT | Performed by: INTERNAL MEDICINE

## 2023-05-31 ASSESSMENT — PATIENT HEALTH QUESTIONNAIRE - PHQ9
CLINICAL INTERPRETATION OF PHQ2 SCORE: NO FURTHER SCREENING NEEDED
SUM OF ALL RESPONSES TO PHQ9 QUESTIONS 1 AND 2: 0
2. FEELING DOWN, DEPRESSED OR HOPELESS: NOT AT ALL
SUM OF ALL RESPONSES TO PHQ9 QUESTIONS 1 AND 2: 0
1. LITTLE INTEREST OR PLEASURE IN DOING THINGS: NOT AT ALL

## 2023-06-01 ENCOUNTER — TELEPHONE (OUTPATIENT)
Dept: INTERNAL MEDICINE | Age: 23
End: 2023-06-01

## 2023-06-01 ENCOUNTER — OFFICE VISIT (OUTPATIENT)
Dept: MATERNAL FETAL MEDICINE | Age: 23
End: 2023-06-01
Attending: OBSTETRICS & GYNECOLOGY

## 2023-06-01 ENCOUNTER — OB CHECK (OUTPATIENT)
Dept: MATERNAL FETAL MEDICINE | Age: 23
End: 2023-06-01
Attending: OBSTETRICS & GYNECOLOGY

## 2023-06-01 DIAGNOSIS — O09.30 LATE PRENATAL CARE: ICD-10-CM

## 2023-06-01 DIAGNOSIS — Z11.1 SCREENING FOR TUBERCULOSIS: Primary | ICD-10-CM

## 2023-06-01 DIAGNOSIS — O40.3XX0 POLYHYDRAMNIOS IN THIRD TRIMESTER COMPLICATION, SINGLE OR UNSPECIFIED FETUS: Primary | ICD-10-CM

## 2023-06-01 PROCEDURE — 76815 OB US LIMITED FETUS(S): CPT

## 2023-06-01 PROCEDURE — 59025 FETAL NON-STRESS TEST: CPT | Performed by: OBSTETRICS & GYNECOLOGY

## 2023-06-01 PROCEDURE — 76815 OB US LIMITED FETUS(S): CPT | Performed by: OBSTETRICS & GYNECOLOGY

## 2023-06-02 ENCOUNTER — TELEPHONE (OUTPATIENT)
Dept: INTERNAL MEDICINE | Age: 23
End: 2023-06-02

## 2023-06-02 ENCOUNTER — LAB SERVICES (OUTPATIENT)
Dept: LAB | Age: 23
End: 2023-06-02

## 2023-06-02 ENCOUNTER — APPOINTMENT (OUTPATIENT)
Dept: INTERNAL MEDICINE | Age: 23
End: 2023-06-02

## 2023-06-02 DIAGNOSIS — Z11.1 SCREENING FOR TUBERCULOSIS: ICD-10-CM

## 2023-06-02 PROCEDURE — 36415 COLL VENOUS BLD VENIPUNCTURE: CPT | Performed by: INTERNAL MEDICINE

## 2023-06-02 PROCEDURE — 86480 TB TEST CELL IMMUN MEASURE: CPT | Performed by: INTERNAL MEDICINE

## 2023-06-04 LAB
GAMMA INTERFERON BACKGROUND BLD IA-ACNC: 0.01 IU/ML
M TB IFN-G BLD-IMP: ABNORMAL
M TB IFN-G CD4+ BCKGRND COR BLD-ACNC: 0 IU/ML
M TB IFN-G CD4+CD8+ BCKGRND COR BLD-ACNC: 0.01 IU/ML
MITOGEN IGNF BCKGRD COR BLD-ACNC: 0.17 IU/ML

## 2023-06-05 ENCOUNTER — HOSPITAL ENCOUNTER (INPATIENT)
Age: 23
LOS: 4 days | Discharge: HOME | End: 2023-06-09
Attending: OBSTETRICS & GYNECOLOGY | Admitting: OBSTETRICS & GYNECOLOGY

## 2023-06-05 DIAGNOSIS — G89.18 POSTOPERATIVE PAIN: Primary | ICD-10-CM

## 2023-06-05 LAB
ABO + RH BLD: NORMAL
ALT SERPL-CCNC: 28 UNITS/L
AST SERPL-CCNC: 28 UNITS/L
BLD GP AB INVEST PLASRBC-IMP: NORMAL
BLD GP AB SCN SERPL QL GEL: POSITIVE
CREAT SERPL-MCNC: 0.78 MG/DL (ref 0.51–0.95)
CREAT UR-MCNC: 30.3 MG/DL
DEPRECATED RDW RBC: 41.1 FL (ref 39–50)
DEPRECATED RDW RBC: 41.9 FL (ref 39–50)
ERYTHROCYTE [DISTWIDTH] IN BLOOD: 14.9 % (ref 11–15)
ERYTHROCYTE [DISTWIDTH] IN BLOOD: 15 % (ref 11–15)
GFR SERPLBLD BASED ON 1.73 SQ M-ARVRAT: >90 ML/MIN
GLUCOSE BLDC GLUCOMTR-MCNC: 82 MG/DL (ref 70–99)
HCT VFR BLD CALC: 30.6 % (ref 36–46.5)
HCT VFR BLD CALC: 31.9 % (ref 36–46.5)
HGB BLD-MCNC: 10.2 G/DL (ref 12–15.5)
HGB BLD-MCNC: 9.8 G/DL (ref 12–15.5)
LDH SERPL L TO P-CCNC: 239 UNITS/L (ref 82–240)
MCH RBC QN AUTO: 26 PG (ref 26–34)
MCH RBC QN AUTO: 26 PG (ref 26–34)
MCHC RBC AUTO-ENTMCNC: 32 G/DL (ref 32–36.5)
MCHC RBC AUTO-ENTMCNC: 32 G/DL (ref 32–36.5)
MCV RBC AUTO: 81.2 FL (ref 78–100)
MCV RBC AUTO: 81.2 FL (ref 78–100)
NRBC BLD MANUAL-RTO: 0 /100 WBC
NRBC BLD MANUAL-RTO: 0 /100 WBC
PLATELET # BLD AUTO: 165 K/MCL (ref 140–450)
PLATELET # BLD AUTO: 184 K/MCL (ref 140–450)
PROT UR-MCNC: 8 MG/DL
PROT/CREAT UR: 264 MGPR/GCR
RBC # BLD: 3.77 MIL/MCL (ref 4–5.2)
RBC # BLD: 3.93 MIL/MCL (ref 4–5.2)
TYPE AND SCREEN EXPIRATION DATE: NORMAL
WBC # BLD: 8.1 K/MCL (ref 4.2–11)
WBC # BLD: 8.4 K/MCL (ref 4.2–11)

## 2023-06-05 PROCEDURE — 84450 TRANSFERASE (AST) (SGOT): CPT | Performed by: OBSTETRICS & GYNECOLOGY

## 2023-06-05 PROCEDURE — 86870 RBC ANTIBODY IDENTIFICATION: CPT | Performed by: OBSTETRICS & GYNECOLOGY

## 2023-06-05 PROCEDURE — 82565 ASSAY OF CREATININE: CPT | Performed by: OBSTETRICS & GYNECOLOGY

## 2023-06-05 PROCEDURE — 85027 COMPLETE CBC AUTOMATED: CPT | Performed by: OBSTETRICS & GYNECOLOGY

## 2023-06-05 PROCEDURE — 36415 COLL VENOUS BLD VENIPUNCTURE: CPT | Performed by: OBSTETRICS & GYNECOLOGY

## 2023-06-05 PROCEDURE — 10002807 HB RX 258

## 2023-06-05 PROCEDURE — 84156 ASSAY OF PROTEIN URINE: CPT | Performed by: OBSTETRICS & GYNECOLOGY

## 2023-06-05 PROCEDURE — 84460 ALANINE AMINO (ALT) (SGPT): CPT | Performed by: OBSTETRICS & GYNECOLOGY

## 2023-06-05 PROCEDURE — 83615 LACTATE (LD) (LDH) ENZYME: CPT | Performed by: OBSTETRICS & GYNECOLOGY

## 2023-06-05 PROCEDURE — 13003286 HB ROOM CHARGE L&D

## 2023-06-05 PROCEDURE — 86901 BLOOD TYPING SEROLOGIC RH(D): CPT | Performed by: OBSTETRICS & GYNECOLOGY

## 2023-06-05 PROCEDURE — 10002800 HB RX 250 W HCPCS

## 2023-06-05 RX ORDER — OXYTOCIN/0.9 % SODIUM CHLORIDE 30/500 ML
0-20 PLASTIC BAG, INJECTION (ML) INTRAVENOUS CONTINUOUS
Status: DISCONTINUED | OUTPATIENT
Start: 2023-06-05 | End: 2023-06-07

## 2023-06-05 RX ORDER — LIDOCAINE HYDROCHLORIDE 10 MG/ML
300 INJECTION, SOLUTION EPIDURAL; INFILTRATION; INTRACAUDAL; PERINEURAL
Status: DISCONTINUED | OUTPATIENT
Start: 2023-06-05 | End: 2023-06-07

## 2023-06-05 RX ORDER — ONDANSETRON 2 MG/ML
4 INJECTION INTRAMUSCULAR; INTRAVENOUS 2 TIMES DAILY PRN
Status: DISCONTINUED | OUTPATIENT
Start: 2023-06-05 | End: 2023-06-07

## 2023-06-05 RX ORDER — OXYTOCIN-SODIUM CHLORIDE 0.9% IV SOLN 30 UNIT/500ML 30-0.9/5 UT/ML-%
0-334 SOLUTION INTRAVENOUS CONTINUOUS
Status: DISCONTINUED | OUTPATIENT
Start: 2023-06-05 | End: 2023-06-07

## 2023-06-05 RX ORDER — SODIUM CHLORIDE, SODIUM LACTATE, POTASSIUM CHLORIDE, CALCIUM CHLORIDE 600; 310; 30; 20 MG/100ML; MG/100ML; MG/100ML; MG/100ML
INJECTION, SOLUTION INTRAVENOUS
Status: COMPLETED
Start: 2023-06-05 | End: 2023-06-05

## 2023-06-05 RX ORDER — SODIUM CHLORIDE, SODIUM LACTATE, POTASSIUM CHLORIDE, CALCIUM CHLORIDE 600; 310; 30; 20 MG/100ML; MG/100ML; MG/100ML; MG/100ML
INJECTION, SOLUTION INTRAVENOUS CONTINUOUS
Status: DISCONTINUED | OUTPATIENT
Start: 2023-06-05 | End: 2023-06-07

## 2023-06-05 RX ORDER — OXYTOCIN/0.9 % SODIUM CHLORIDE 30/500 ML
PLASTIC BAG, INJECTION (ML) INTRAVENOUS
Status: COMPLETED
Start: 2023-06-05 | End: 2023-06-05

## 2023-06-05 RX ORDER — 0.9 % SODIUM CHLORIDE 0.9 %
2 VIAL (ML) INJECTION EVERY 12 HOURS SCHEDULED
Status: DISCONTINUED | OUTPATIENT
Start: 2023-06-06 | End: 2023-06-07

## 2023-06-05 RX ADMIN — Medication 2 MILLI-UNITS/MIN: at 21:07

## 2023-06-05 RX ADMIN — SODIUM CHLORIDE, POTASSIUM CHLORIDE, SODIUM LACTATE AND CALCIUM CHLORIDE: 600; 310; 30; 20 INJECTION, SOLUTION INTRAVENOUS at 21:04

## 2023-06-05 SDOH — SOCIAL STABILITY: SOCIAL NETWORK
HOW OFTEN DO YOU SEE OR TALK TO PEOPLE THAT YOU CARE ABOUT AND FEEL CLOSE TO? (FOR EXAMPLE: TALKING TO FRIENDS ON THE PHONE, VISITING FRIENDS OR FAMILY, GOING TO CHURCH OR CLUB MEETINGS): 5 OR MORE TIMES A WEEK

## 2023-06-05 SDOH — HEALTH STABILITY: PHYSICAL HEALTH: DO YOU HAVE SERIOUS DIFFICULTY WALKING OR CLIMBING STAIRS?: NO

## 2023-06-05 SDOH — ECONOMIC STABILITY: HOUSING INSECURITY: WHAT IS YOUR LIVING SITUATION TODAY?: PARENT

## 2023-06-05 SDOH — ECONOMIC STABILITY: HOUSING INSECURITY: ARE YOU WORRIED ABOUT LOSING YOUR HOUSING?: NO

## 2023-06-05 SDOH — HEALTH STABILITY: PHYSICAL HEALTH: DO YOU HAVE DIFFICULTY DRESSING OR BATHING?: NO

## 2023-06-05 SDOH — SOCIAL STABILITY: SOCIAL NETWORK: SUPPORT SYSTEMS: FAMILY MEMBERS

## 2023-06-05 SDOH — ECONOMIC STABILITY: GENERAL

## 2023-06-05 SDOH — HEALTH STABILITY: GENERAL: BECAUSE OF A PHYSICAL, MENTAL, OR EMOTIONAL CONDITION, DO YOU HAVE DIFFICULTY DOING ERRANDS ALONE?: NO

## 2023-06-05 SDOH — ECONOMIC STABILITY: FOOD INSECURITY: HOW OFTEN IN THE PAST 12 MONTHS WERE YOU WORRIED OR STRESSED ABOUT HAVING ENOUGH MONEY TO BUY NUTRITIOUS MEALS?: NEVER

## 2023-06-05 SDOH — ECONOMIC STABILITY: HOUSING INSECURITY: WHAT IS YOUR LIVING SITUATION TODAY?: HOUSE

## 2023-06-05 ASSESSMENT — LIFESTYLE VARIABLES
HOW OFTEN DO YOU HAVE A DRINK CONTAINING ALCOHOL: NEVER
HOW MANY STANDARD DRINKS CONTAINING ALCOHOL DO YOU HAVE ON A TYPICAL DAY: 0,1 OR 2
AUDIT-C TOTAL SCORE: 0
ALCOHOL_USE_STATUS: NO OR LOW RISK WITH VALIDATED TOOL
HOW OFTEN DO YOU HAVE 6 OR MORE DRINKS ON ONE OCCASION: NEVER

## 2023-06-05 ASSESSMENT — ACTIVITIES OF DAILY LIVING (ADL)
RECENT_DECLINE_ADL: NO
ADL_BEFORE_ADMISSION: INDEPENDENT
ADL_SCORE: 12
ADL_SHORT_OF_BREATH: NO

## 2023-06-06 ENCOUNTER — ANESTHESIA EVENT (OUTPATIENT)
Dept: OBGYN | Age: 23
End: 2023-06-06

## 2023-06-06 ENCOUNTER — ANESTHESIA (OUTPATIENT)
Dept: OBGYN | Age: 23
End: 2023-06-06

## 2023-06-06 LAB
BASE EXCESS / DEFICIT, VENOUS CORD BLOOD - RESPIRATORY: -3 MMOL/L
GLUCOSE BLDC GLUCOMTR-MCNC: 92 MG/DL (ref 70–99)
HCO3 BLDCOV-SCNC: 22 MMOL/L (ref 22–29)
PCO2 BLDCOV: 40 MM HG (ref 23–49)
PH BLDCOV: 7.35 UNITS (ref 7.25–7.45)
PO2 BLDCOV: 28 MM HG (ref 17–41)

## 2023-06-06 PROCEDURE — 13000012 HB ANESTHESIA SPINAL/EPIDURAL IN L&D: Performed by: OBSTETRICS & GYNECOLOGY

## 2023-06-06 PROCEDURE — 13000001 HB PHASE II RECOVERY EA 30 MINUTES: Performed by: OBSTETRICS & GYNECOLOGY

## 2023-06-06 PROCEDURE — 10002800 HB RX 250 W HCPCS: Performed by: GENERAL ACUTE CARE HOSPITAL

## 2023-06-06 PROCEDURE — 82803 BLOOD GASES ANY COMBINATION: CPT

## 2023-06-06 PROCEDURE — 10002800 HB RX 250 W HCPCS: Performed by: OBSTETRICS & GYNECOLOGY

## 2023-06-06 PROCEDURE — 13001455 HB PERINATAL CARE HIGH RISK

## 2023-06-06 PROCEDURE — 10002801 HB RX 250 W/O HCPCS: Performed by: GENERAL ACUTE CARE HOSPITAL

## 2023-06-06 PROCEDURE — 10002807 HB RX 258: Performed by: OBSTETRICS & GYNECOLOGY

## 2023-06-06 PROCEDURE — 10002807 HB RX 258: Performed by: GENERAL ACUTE CARE HOSPITAL

## 2023-06-06 PROCEDURE — 13003286 HB ROOM CHARGE L&D

## 2023-06-06 PROCEDURE — 13000037 HB COMPLEX CASE EACH ADD MINUTE: Performed by: OBSTETRICS & GYNECOLOGY

## 2023-06-06 PROCEDURE — 10004180 HB COUNTER-TRANSPORT

## 2023-06-06 PROCEDURE — 59514 CESAREAN DELIVERY ONLY: CPT | Performed by: LEGAL MEDICINE

## 2023-06-06 PROCEDURE — 88307 TISSUE EXAM BY PATHOLOGIST: CPT | Performed by: OBSTETRICS & GYNECOLOGY

## 2023-06-06 PROCEDURE — 13000036 HB COMPLEX  CASE S/U + 1ST 15 MIN: Performed by: OBSTETRICS & GYNECOLOGY

## 2023-06-06 PROCEDURE — 10H07YZ INSERTION OF OTHER DEVICE INTO PRODUCTS OF CONCEPTION, VIA NATURAL OR ARTIFICIAL OPENING: ICD-10-PCS | Performed by: OBSTETRICS & GYNECOLOGY

## 2023-06-06 PROCEDURE — 10006023 HB SUPPLY 272: Performed by: OBSTETRICS & GYNECOLOGY

## 2023-06-06 RX ORDER — MEPERIDINE HYDROCHLORIDE 50 MG/ML
INJECTION INTRAMUSCULAR; INTRAVENOUS; SUBCUTANEOUS PRN
Status: DISCONTINUED | OUTPATIENT
Start: 2023-06-06 | End: 2023-06-06

## 2023-06-06 RX ORDER — NALBUPHINE HYDROCHLORIDE 10 MG/ML
2.5 INJECTION, SOLUTION INTRAMUSCULAR; INTRAVENOUS; SUBCUTANEOUS
Status: DISCONTINUED | OUTPATIENT
Start: 2023-06-06 | End: 2023-06-07

## 2023-06-06 RX ORDER — ONDANSETRON 2 MG/ML
INJECTION INTRAMUSCULAR; INTRAVENOUS PRN
Status: DISCONTINUED | OUTPATIENT
Start: 2023-06-06 | End: 2023-06-06

## 2023-06-06 RX ORDER — HEPARIN SOD,PORK IN 0.45% NACL 25000/500
INTRAVENOUS SOLUTION INTRAVENOUS CONTINUOUS
Status: DISCONTINUED | OUTPATIENT
Start: 2023-06-06 | End: 2023-06-07

## 2023-06-06 RX ORDER — EPHEDRINE SULFATE/0.9% NACL/PF 50 MG/10ML
SYRINGE (ML) INTRAVENOUS
Status: COMPLETED
Start: 2023-06-06 | End: 2023-06-06

## 2023-06-06 RX ORDER — KETOROLAC TROMETHAMINE 30 MG/ML
30 INJECTION, SOLUTION INTRAMUSCULAR; INTRAVENOUS ONCE
Status: COMPLETED | OUTPATIENT
Start: 2023-06-06 | End: 2023-06-06

## 2023-06-06 RX ORDER — FAMOTIDINE 10 MG/ML
20 INJECTION, SOLUTION INTRAVENOUS ONCE
Status: DISCONTINUED | OUTPATIENT
Start: 2023-06-06 | End: 2023-06-07

## 2023-06-06 RX ORDER — BUPIVACAINE HYDROCHLORIDE 2.5 MG/ML
10 INJECTION, SOLUTION EPIDURAL; INFILTRATION; INTRACAUDAL ONCE
Status: COMPLETED | OUTPATIENT
Start: 2023-06-06 | End: 2023-06-06

## 2023-06-06 RX ORDER — MORPHINE SULFATE 1 MG/ML
INJECTION, SOLUTION EPIDURAL; INTRATHECAL; INTRAVENOUS PRN
Status: DISCONTINUED | OUTPATIENT
Start: 2023-06-06 | End: 2023-06-06

## 2023-06-06 RX ORDER — LIDOCAINE HCL/EPINEPHRINE/PF 2%-1:200K
VIAL (ML) INJECTION PRN
Status: DISCONTINUED | OUTPATIENT
Start: 2023-06-06 | End: 2023-06-06

## 2023-06-06 RX ADMIN — LIDOCAINE HYDROCHLORIDE,EPINEPHRINE BITARTRATE 5 ML: 20; .005 INJECTION, SOLUTION EPIDURAL; INFILTRATION; INTRACAUDAL; PERINEURAL at 21:07

## 2023-06-06 RX ADMIN — SODIUM CHLORIDE, POTASSIUM CHLORIDE, SODIUM LACTATE AND CALCIUM CHLORIDE: 600; 310; 30; 20 INJECTION, SOLUTION INTRAVENOUS at 21:46

## 2023-06-06 RX ADMIN — LIDOCAINE HYDROCHLORIDE,EPINEPHRINE BITARTRATE 5 ML: 20; .005 INJECTION, SOLUTION EPIDURAL; INFILTRATION; INTRACAUDAL; PERINEURAL at 21:03

## 2023-06-06 RX ADMIN — SODIUM CHLORIDE, POTASSIUM CHLORIDE, SODIUM LACTATE AND CALCIUM CHLORIDE: 600; 310; 30; 20 INJECTION, SOLUTION INTRAVENOUS at 16:18

## 2023-06-06 RX ADMIN — ONDANSETRON 4 MG: 2 INJECTION INTRAMUSCULAR; INTRAVENOUS at 21:53

## 2023-06-06 RX ADMIN — LIDOCAINE HYDROCHLORIDE,EPINEPHRINE BITARTRATE 5 ML: 20; .005 INJECTION, SOLUTION EPIDURAL; INFILTRATION; INTRACAUDAL; PERINEURAL at 20:57

## 2023-06-06 RX ADMIN — OXYTOCIN 334 MILLI-UNITS/MIN: 10 INJECTION, SOLUTION INTRAMUSCULAR; INTRAVENOUS at 21:19

## 2023-06-06 RX ADMIN — AMPICILLIN SODIUM 2000 MG: 2 INJECTION, POWDER, FOR SOLUTION INTRAVENOUS at 17:42

## 2023-06-06 RX ADMIN — MORPHINE SULFATE 3.5 MG: 1 INJECTION, SOLUTION EPIDURAL; INTRATHECAL; INTRAVENOUS at 21:19

## 2023-06-06 RX ADMIN — SODIUM CHLORIDE, POTASSIUM CHLORIDE, SODIUM LACTATE AND CALCIUM CHLORIDE: 600; 310; 30; 20 INJECTION, SOLUTION INTRAVENOUS at 11:42

## 2023-06-06 RX ADMIN — HYDROMORPHONE HYDROCHLORIDE 0.5 MG: 1 INJECTION, SOLUTION INTRAMUSCULAR; INTRAVENOUS; SUBCUTANEOUS at 03:26

## 2023-06-06 RX ADMIN — Medication 200 MCG: at 20:25

## 2023-06-06 RX ADMIN — MEPERIDINE HYDROCHLORIDE 25 MG: 50 INJECTION INTRAMUSCULAR; INTRAVENOUS; SUBCUTANEOUS at 21:53

## 2023-06-06 RX ADMIN — LIDOCAINE HYDROCHLORIDE,EPINEPHRINE BITARTRATE 5 ML: 20; .005 INJECTION, SOLUTION EPIDURAL; INFILTRATION; INTRACAUDAL; PERINEURAL at 21:01

## 2023-06-06 RX ADMIN — SODIUM CHLORIDE, POTASSIUM CHLORIDE, SODIUM LACTATE AND CALCIUM CHLORIDE: 600; 310; 30; 20 INJECTION, SOLUTION INTRAVENOUS at 06:18

## 2023-06-06 RX ADMIN — BUPIVACAINE HYDROCHLORIDE 5 ML: 2.5 INJECTION, SOLUTION EPIDURAL; INFILTRATION; INTRACAUDAL; PERINEURAL at 18:13

## 2023-06-06 RX ADMIN — CEFAZOLIN SODIUM 2000 MG: 300 INJECTION, POWDER, LYOPHILIZED, FOR SOLUTION INTRAVENOUS at 20:58

## 2023-06-06 RX ADMIN — Medication 10 ML/HR: at 11:59

## 2023-06-06 RX ADMIN — KETOROLAC TROMETHAMINE 30 MG: 30 INJECTION, SOLUTION INTRAMUSCULAR; INTRAVENOUS at 23:27

## 2023-06-06 RX ADMIN — BUPIVACAINE HYDROCHLORIDE 5 ML: 2.5 INJECTION, SOLUTION EPIDURAL; INFILTRATION; INTRACAUDAL; PERINEURAL at 11:59

## 2023-06-06 RX ADMIN — LIDOCAINE HYDROCHLORIDE,EPINEPHRINE BITARTRATE 5 ML: 20; .005 INJECTION, SOLUTION EPIDURAL; INFILTRATION; INTRACAUDAL; PERINEURAL at 20:59

## 2023-06-06 RX ADMIN — SODIUM CHLORIDE, POTASSIUM CHLORIDE, SODIUM LACTATE AND CALCIUM CHLORIDE: 600; 310; 30; 20 INJECTION, SOLUTION INTRAVENOUS at 21:14

## 2023-06-06 ASSESSMENT — PAIN SCALES - GENERAL
PAINLEVEL_OUTOF10: 3
PAINLEVEL_OUTOF10: 0
PAINLEVEL_OUTOF10: 3
PAINLEVEL_OUTOF10: 0
PAINLEVEL_OUTOF10: 3
PAINLEVEL_OUTOF10: 0
PAINLEVEL_OUTOF10: 3
PAINLEVEL_OUTOF10: 0

## 2023-06-07 LAB
ABO + RH BLD: NORMAL
DEPRECATED RDW RBC: 42.5 FL (ref 39–50)
ERYTHROCYTE [DISTWIDTH] IN BLOOD: 14.9 % (ref 11–15)
FETAL CELL SCN BLD QL ROSETTE: NEGATIVE
HCT VFR BLD CALC: 24.2 % (ref 36–46.5)
HGB BLD-MCNC: 7.7 G/DL (ref 12–15.5)
MCH RBC QN AUTO: 25.8 PG (ref 26–34)
MCHC RBC AUTO-ENTMCNC: 31.8 G/DL (ref 32–36.5)
MCV RBC AUTO: 81.2 FL (ref 78–100)
NRBC BLD MANUAL-RTO: 0 /100 WBC
PLATELET # BLD AUTO: 133 K/MCL (ref 140–450)
RBC # BLD: 2.98 MIL/MCL (ref 4–5.2)
WBC # BLD: 14.6 K/MCL (ref 4.2–11)

## 2023-06-07 PROCEDURE — 36415 COLL VENOUS BLD VENIPUNCTURE: CPT | Performed by: OBSTETRICS & GYNECOLOGY

## 2023-06-07 PROCEDURE — 10000003 HB ROOM CHARGE WOMEN'S HEALTH

## 2023-06-07 PROCEDURE — 86901 BLOOD TYPING SEROLOGIC RH(D): CPT | Performed by: OBSTETRICS & GYNECOLOGY

## 2023-06-07 PROCEDURE — 10004651 HB RX, NO CHARGE ITEM: Performed by: OBSTETRICS & GYNECOLOGY

## 2023-06-07 PROCEDURE — 10002800 HB RX 250 W HCPCS: Performed by: OBSTETRICS & GYNECOLOGY

## 2023-06-07 PROCEDURE — 10002807 HB RX 258: Performed by: OBSTETRICS & GYNECOLOGY

## 2023-06-07 PROCEDURE — 85027 COMPLETE CBC AUTOMATED: CPT | Performed by: OBSTETRICS & GYNECOLOGY

## 2023-06-07 PROCEDURE — S9445 PT EDUCATION NOC INDIVID: HCPCS

## 2023-06-07 PROCEDURE — 10002803 HB RX 637: Performed by: OBSTETRICS & GYNECOLOGY

## 2023-06-07 RX ORDER — OXYTOCIN-SODIUM CHLORIDE 0.9% IV SOLN 30 UNIT/500ML 30-0.9/5 UT/ML-%
0-334 SOLUTION INTRAVENOUS CONTINUOUS
Status: DISCONTINUED | OUTPATIENT
Start: 2023-06-07 | End: 2023-06-09 | Stop reason: HOSPADM

## 2023-06-07 RX ORDER — POLYETHYLENE GLYCOL 3350 17 G/17G
17 POWDER, FOR SOLUTION ORAL DAILY PRN
Status: DISCONTINUED | OUTPATIENT
Start: 2023-06-07 | End: 2023-06-09 | Stop reason: HOSPADM

## 2023-06-07 RX ORDER — SIMETHICONE 125 MG
125 TABLET,CHEWABLE ORAL 4 TIMES DAILY PRN
Status: DISCONTINUED | OUTPATIENT
Start: 2023-06-07 | End: 2023-06-09 | Stop reason: HOSPADM

## 2023-06-07 RX ORDER — ACETAMINOPHEN 325 MG/1
650 TABLET ORAL EVERY 6 HOURS SCHEDULED
Status: DISCONTINUED | OUTPATIENT
Start: 2023-06-07 | End: 2023-06-09 | Stop reason: HOSPADM

## 2023-06-07 RX ORDER — CALCIUM CARBONATE 500 MG/1
500 TABLET, CHEWABLE ORAL EVERY 4 HOURS PRN
Status: DISCONTINUED | OUTPATIENT
Start: 2023-06-07 | End: 2023-06-09 | Stop reason: HOSPADM

## 2023-06-07 RX ORDER — AMOXICILLIN 250 MG
2 CAPSULE ORAL DAILY PRN
Status: DISCONTINUED | OUTPATIENT
Start: 2023-06-07 | End: 2023-06-09 | Stop reason: HOSPADM

## 2023-06-07 RX ORDER — OXYCODONE HYDROCHLORIDE 5 MG/1
5 TABLET ORAL EVERY 6 HOURS PRN
Status: DISCONTINUED | OUTPATIENT
Start: 2023-06-07 | End: 2023-06-09 | Stop reason: HOSPADM

## 2023-06-07 RX ORDER — ONDANSETRON 2 MG/ML
4 INJECTION INTRAMUSCULAR; INTRAVENOUS 2 TIMES DAILY PRN
Status: ACTIVE | OUTPATIENT
Start: 2023-06-07 | End: 2023-06-09

## 2023-06-07 RX ORDER — KETOROLAC TROMETHAMINE 30 MG/ML
15 INJECTION, SOLUTION INTRAMUSCULAR; INTRAVENOUS EVERY 6 HOURS
Status: COMPLETED | OUTPATIENT
Start: 2023-06-07 | End: 2023-06-07

## 2023-06-07 RX ORDER — SODIUM CHLORIDE, SODIUM LACTATE, POTASSIUM CHLORIDE, CALCIUM CHLORIDE 600; 310; 30; 20 MG/100ML; MG/100ML; MG/100ML; MG/100ML
INJECTION, SOLUTION INTRAVENOUS CONTINUOUS
Status: DISCONTINUED | OUTPATIENT
Start: 2023-06-07 | End: 2023-06-09 | Stop reason: HOSPADM

## 2023-06-07 RX ORDER — IBUPROFEN 600 MG/1
600 TABLET ORAL EVERY 6 HOURS SCHEDULED
Status: DISCONTINUED | OUTPATIENT
Start: 2023-06-08 | End: 2023-06-09 | Stop reason: HOSPADM

## 2023-06-07 RX ORDER — LORATADINE 10 MG/1
10 TABLET ORAL
Status: DISCONTINUED | OUTPATIENT
Start: 2023-06-07 | End: 2023-06-09 | Stop reason: HOSPADM

## 2023-06-07 RX ORDER — NALOXONE HCL 0.4 MG/ML
0.1 VIAL (ML) INJECTION PRN
Status: ACTIVE | OUTPATIENT
Start: 2023-06-07 | End: 2023-06-08

## 2023-06-07 RX ORDER — VITAMIN A, VITAMIN C, VITAMIN D-3, VITAMIN E, VITAMIN B-1, VITAMIN B-2, NIACIN, VITAMIN B-6, CALCIUM, IRON, ZINC, COPPER 4000; 120; 400; 22; 1.84; 3; 20; 10; 1; 12; 200; 27; 25; 2 [IU]/1; MG/1; [IU]/1; MG/1; MG/1; MG/1; MG/1; MG/1; MG/1; UG/1; MG/1; MG/1; MG/1; MG/1
1 TABLET ORAL DAILY
Status: DISCONTINUED | OUTPATIENT
Start: 2023-06-07 | End: 2023-06-09 | Stop reason: HOSPADM

## 2023-06-07 RX ADMIN — AMPICILLIN SODIUM 2000 MG: 2 INJECTION, POWDER, FOR SOLUTION INTRAVENOUS at 08:54

## 2023-06-07 RX ADMIN — SODIUM CHLORIDE, POTASSIUM CHLORIDE, SODIUM LACTATE AND CALCIUM CHLORIDE: 600; 310; 30; 20 INJECTION, SOLUTION INTRAVENOUS at 01:26

## 2023-06-07 RX ADMIN — LORATADINE 10 MG: 10 TABLET ORAL at 17:48

## 2023-06-07 RX ADMIN — KETOROLAC TROMETHAMINE 15 MG: 30 INJECTION, SOLUTION INTRAMUSCULAR at 17:47

## 2023-06-07 RX ADMIN — ACETAMINOPHEN 650 MG: 325 TABLET ORAL at 08:54

## 2023-06-07 RX ADMIN — VITAMIN A, VITAMIN C, VITAMIN D, VITAMIN E, THIAMINE, RIBOFLAVIN, NIACIN, VITAMIN B6, FOLIC ACID, VITAMIN B12, CALCIUM, IRON, ZINC, COPPER 1 TABLET: 4000; 120; 400; 22; 1.84; 3; 20; 10; 1; 12; 200; 27; 25; 2 TABLET ORAL at 21:07

## 2023-06-07 RX ADMIN — ACETAMINOPHEN 650 MG: 325 TABLET ORAL at 21:07

## 2023-06-07 RX ADMIN — ACETAMINOPHEN 650 MG: 325 TABLET ORAL at 15:15

## 2023-06-07 RX ADMIN — HUMAN RHO(D) IMMUNE GLOBULIN 300 MCG: 1500 SOLUTION INTRAMUSCULAR; INTRAVENOUS at 17:50

## 2023-06-07 RX ADMIN — AMPICILLIN SODIUM 2000 MG: 2 INJECTION, POWDER, FOR SOLUTION INTRAVENOUS at 02:42

## 2023-06-07 RX ADMIN — KETOROLAC TROMETHAMINE 15 MG: 30 INJECTION, SOLUTION INTRAMUSCULAR at 05:14

## 2023-06-07 RX ADMIN — KETOROLAC TROMETHAMINE 15 MG: 30 INJECTION, SOLUTION INTRAMUSCULAR at 11:23

## 2023-06-07 ASSESSMENT — PAIN SCALES - GENERAL
PAINLEVEL_OUTOF10: 1
PAINLEVEL_OUTOF10: 3
PAINLEVEL_OUTOF10: 2
PAINLEVEL_OUTOF10: 3
PAINLEVEL_OUTOF10: 1
PAINLEVEL_OUTOF10: 0
PAINLEVEL_OUTOF10: 3
PAINLEVEL_OUTOF10: 3
PAINLEVEL_OUTOF10: 2
PAINLEVEL_OUTOF10: 2

## 2023-06-07 ASSESSMENT — PAIN SCALES - WONG BAKER: WONGBAKER_NUMERICALRESPONSE: 0

## 2023-06-08 ENCOUNTER — APPOINTMENT (OUTPATIENT)
Dept: MATERNAL FETAL MEDICINE | Age: 23
End: 2023-06-08
Attending: OBSTETRICS & GYNECOLOGY

## 2023-06-08 LAB
HCT VFR BLD CALC: 23.4 % (ref 36–46.5)
HGB BLD-MCNC: 7.3 G/DL (ref 12–15.5)

## 2023-06-08 PROCEDURE — 10000003 HB ROOM CHARGE WOMEN'S HEALTH

## 2023-06-08 PROCEDURE — 36415 COLL VENOUS BLD VENIPUNCTURE: CPT | Performed by: OBSTETRICS & GYNECOLOGY

## 2023-06-08 PROCEDURE — 10002800 HB RX 250 W HCPCS: Performed by: OBSTETRICS & GYNECOLOGY

## 2023-06-08 PROCEDURE — 10002803 HB RX 637: Performed by: OBSTETRICS & GYNECOLOGY

## 2023-06-08 PROCEDURE — 10004651 HB RX, NO CHARGE ITEM: Performed by: OBSTETRICS & GYNECOLOGY

## 2023-06-08 PROCEDURE — 85014 HEMATOCRIT: CPT | Performed by: OBSTETRICS & GYNECOLOGY

## 2023-06-08 PROCEDURE — S9445 PT EDUCATION NOC INDIVID: HCPCS

## 2023-06-08 RX ADMIN — ACETAMINOPHEN 650 MG: 325 TABLET ORAL at 08:56

## 2023-06-08 RX ADMIN — IBUPROFEN 600 MG: 600 TABLET, FILM COATED ORAL at 22:10

## 2023-06-08 RX ADMIN — ACETAMINOPHEN 650 MG: 325 TABLET ORAL at 02:53

## 2023-06-08 RX ADMIN — OXYCODONE HYDROCHLORIDE 5 MG: 5 TABLET ORAL at 09:54

## 2023-06-08 RX ADMIN — IBUPROFEN 600 MG: 600 TABLET, FILM COATED ORAL at 16:02

## 2023-06-08 RX ADMIN — ACETAMINOPHEN 650 MG: 325 TABLET ORAL at 16:02

## 2023-06-08 RX ADMIN — IRON SUCROSE 200 MG: 20 INJECTION, SOLUTION INTRAVENOUS at 12:06

## 2023-06-08 RX ADMIN — IBUPROFEN 600 MG: 600 TABLET, FILM COATED ORAL at 00:48

## 2023-06-08 RX ADMIN — SENNOSIDES AND DOCUSATE SODIUM 2 TABLET: 50; 8.6 TABLET ORAL at 08:56

## 2023-06-08 RX ADMIN — IBUPROFEN 600 MG: 600 TABLET, FILM COATED ORAL at 06:09

## 2023-06-08 ASSESSMENT — PAIN SCALES - GENERAL
PAINLEVEL_OUTOF10: 0
PAINLEVEL_OUTOF10: 3
PAINLEVEL_OUTOF10: 3
PAINLEVEL_OUTOF10: 0
PAINLEVEL_OUTOF10: 0
PAINLEVEL_OUTOF10: 3
PAINLEVEL_OUTOF10: 3

## 2023-06-09 VITALS
TEMPERATURE: 97.5 F | HEART RATE: 101 BPM | WEIGHT: 191 LBS | HEIGHT: 69 IN | DIASTOLIC BLOOD PRESSURE: 89 MMHG | OXYGEN SATURATION: 98 % | RESPIRATION RATE: 16 BRPM | SYSTOLIC BLOOD PRESSURE: 127 MMHG | BODY MASS INDEX: 28.29 KG/M2

## 2023-06-09 LAB
ASR DISCLAIMER: NORMAL
CASE RPRT: NORMAL
CLINICAL INFO: NORMAL
HCT VFR BLD CALC: 25.1 % (ref 36–46.5)
HGB BLD-MCNC: 7.8 G/DL (ref 12–15.5)
PATH REPORT.FINAL DX SPEC: NORMAL
PATH REPORT.GROSS SPEC: NORMAL

## 2023-06-09 PROCEDURE — S9445 PT EDUCATION NOC INDIVID: HCPCS

## 2023-06-09 PROCEDURE — 36415 COLL VENOUS BLD VENIPUNCTURE: CPT | Performed by: OBSTETRICS & GYNECOLOGY

## 2023-06-09 PROCEDURE — 85014 HEMATOCRIT: CPT | Performed by: OBSTETRICS & GYNECOLOGY

## 2023-06-09 RX ORDER — ACETAMINOPHEN 500 MG
1000 TABLET ORAL EVERY 6 HOURS PRN
Status: SHIPPED | COMMUNITY
Start: 2023-06-09

## 2023-06-09 RX ORDER — IBUPROFEN 600 MG/1
600 TABLET ORAL EVERY 6 HOURS PRN
Qty: 40 TABLET | Refills: 0 | Status: SHIPPED | OUTPATIENT
Start: 2023-06-09

## 2023-06-09 RX ORDER — OXYCODONE HYDROCHLORIDE 5 MG/1
5 TABLET ORAL EVERY 6 HOURS PRN
Qty: 10 TABLET | Refills: 0 | Status: SHIPPED | OUTPATIENT
Start: 2023-06-09

## 2023-06-09 ASSESSMENT — PAIN SCALES - GENERAL: PAINLEVEL_OUTOF10: 0

## 2024-02-21 ENCOUNTER — CLINICAL ABSTRACT (OUTPATIENT)
Age: 24
End: 2024-02-21

## 2024-05-21 ENCOUNTER — APPOINTMENT (OUTPATIENT)
Dept: INTERNAL MEDICINE | Age: 24
End: 2024-05-21

## 2024-07-29 ENCOUNTER — APPOINTMENT (OUTPATIENT)
Dept: INTERNAL MEDICINE | Age: 24
End: 2024-07-29

## 2024-08-01 ENCOUNTER — APPOINTMENT (OUTPATIENT)
Dept: INTERNAL MEDICINE | Age: 24
End: 2024-08-01

## 2024-08-05 ENCOUNTER — APPOINTMENT (OUTPATIENT)
Dept: INTERNAL MEDICINE | Age: 24
End: 2024-08-05

## 2024-09-26 ENCOUNTER — LAB REQUISITION (OUTPATIENT)
Dept: LAB | Age: 24
End: 2024-09-26

## 2024-09-26 DIAGNOSIS — Z11.3 ENCOUNTER FOR SCREENING FOR INFECTIONS WITH A PREDOMINANTLY SEXUAL MODE OF TRANSMISSION: ICD-10-CM

## 2024-09-26 PROCEDURE — 87591 N.GONORRHOEAE DNA AMP PROB: CPT | Performed by: CLINICAL MEDICAL LABORATORY

## 2024-09-26 PROCEDURE — 87491 CHLMYD TRACH DNA AMP PROBE: CPT | Performed by: CLINICAL MEDICAL LABORATORY

## 2024-09-27 LAB
C TRACH RRNA SPEC QL NAA+PROBE: NEGATIVE
Lab: NORMAL
N GONORRHOEA RRNA SPEC QL NAA+PROBE: NEGATIVE

## 2024-11-12 ENCOUNTER — APPOINTMENT (OUTPATIENT)
Dept: URBAN - METROPOLITAN AREA CLINIC 249 | Age: 24
Setting detail: DERMATOLOGY
End: 2024-11-13

## 2024-11-12 DIAGNOSIS — L93.1 SUBACUTE CUTANEOUS LUPUS ERYTHEMATOSUS: ICD-10-CM

## 2024-11-12 PROCEDURE — 11900 INJECT SKIN LESIONS </W 7: CPT

## 2024-11-12 PROCEDURE — OTHER COUNSELING: OTHER

## 2024-11-12 PROCEDURE — OTHER PRESCRIPTION MEDICATION MANAGEMENT: OTHER

## 2024-11-12 PROCEDURE — OTHER INTRALESIONAL KENALOG: OTHER

## 2024-11-12 PROCEDURE — OTHER OTHER: OTHER

## 2024-11-12 ASSESSMENT — LOCATION DETAILED DESCRIPTION DERM
LOCATION DETAILED: LEFT CENTRAL FRONTAL SCALP
LOCATION DETAILED: LEFT MEDIAL FRONTAL SCALP
LOCATION DETAILED: RIGHT MEDIAL FRONTAL SCALP

## 2024-11-12 ASSESSMENT — LOCATION SIMPLE DESCRIPTION DERM
LOCATION SIMPLE: LEFT SCALP
LOCATION SIMPLE: RIGHT SCALP

## 2024-11-12 ASSESSMENT — LOCATION ZONE DERM: LOCATION ZONE: SCALP

## 2024-11-12 NOTE — PROCEDURE: OTHER
Render Risk Assessment In Note?: no
Note Text (......Xxx Chief Complaint.): This diagnosis correlates with the
Detail Level: Zone
Other (Free Text): Patient had a biopsy and labs at [facility]. Discussed scarring alopecia that can occur due to lupus. She is awaiting bloodwork results and it’s possibly she could start Plaquenil.

## 2024-11-12 NOTE — PROCEDURE: PRESCRIPTION MEDICATION MANAGEMENT
Detail Level: Zone
Defer Treatment (Provide Reason For Deferment In Text Field Below): Plaquenil
Render In Strict Bullet Format?: No
Continue Regimen: Clobetasol Sol

## 2024-11-12 NOTE — PROCEDURE: INTRALESIONAL KENALOG
How Many Mls Were Removed From The 80 Mg/Ml (5ml) Vial When Preparing The Injectable Solution?: 0
Medical Necessity Clause: This procedure was medically necessary because the lesions that were treated were:
Validate Note Data When Using Inventory: Yes
Detail Level: Detailed
Total Volume (Ccs): 2
Consent: The risks of atrophy were reviewed with the patient.
Show Inventory Tab: Hide
Include Z78.9 (Other Specified Conditions Influencing Health Status) As An Associated Diagnosis?: No
Administered By (Optional): Paz
Kenalog Type Of Vial: Multiple Dose
Lot # For Kenalog (Optional): [id number]
Ndc# For Kenalog Only: 9775-5938-90
Expiration Date For Kenalog (Optional): APRIL2026
Concentration Of Kenalog Solution Injected (Mg/Ml): 5.0
Kenalog Preparation: Kenalog

## 2025-04-17 ENCOUNTER — OFFICE VISIT (OUTPATIENT)
Dept: INTERNAL MEDICINE | Age: 25
End: 2025-04-17

## 2025-04-17 VITALS
TEMPERATURE: 97.1 F | RESPIRATION RATE: 16 BRPM | HEIGHT: 68 IN | WEIGHT: 190.8 LBS | OXYGEN SATURATION: 98 % | HEART RATE: 97 BPM | DIASTOLIC BLOOD PRESSURE: 72 MMHG | SYSTOLIC BLOOD PRESSURE: 105 MMHG | BODY MASS INDEX: 28.92 KG/M2

## 2025-04-17 DIAGNOSIS — L03.011 PARONYCHIA OF FINGER OF RIGHT HAND: Primary | ICD-10-CM

## 2025-04-17 PROBLEM — L93.0 DISCOID LUPUS: Status: ACTIVE | Noted: 2025-04-17

## 2025-04-17 PROBLEM — Z3A.36 36 WEEKS GESTATION OF PREGNANCY (CMD): Status: RESOLVED | Noted: 2023-05-25 | Resolved: 2025-04-17

## 2025-04-17 PROCEDURE — 99214 OFFICE O/P EST MOD 30 MIN: CPT | Performed by: INTERNAL MEDICINE

## 2025-04-17 RX ORDER — CEPHALEXIN 500 MG/1
500 CAPSULE ORAL 3 TIMES DAILY
Qty: 21 CAPSULE | Refills: 0 | Status: SHIPPED | OUTPATIENT
Start: 2025-04-17 | End: 2025-04-24

## 2025-07-30 ENCOUNTER — E-ADVICE (OUTPATIENT)
Dept: INTERNAL MEDICINE | Age: 25
End: 2025-07-30

## (undated) DEVICE — SUTURE VICRYL 0 CTX 36IN BRAID COAT ABS VIOL J370H

## (undated) DEVICE — GLOVE SURG 7 PROTEXIS LF CRM PF BEAD CUFF STRL PLISPRN 12IN

## (undated) DEVICE — ELECTRODE PT RTN C30- LB 9FT CORD NONIRRITATE NONSENSITIZE

## (undated) DEVICE — Device

## (undated) DEVICE — KIT RM TURNOVER CSTM DISP STRL LF

## (undated) DEVICE — SOLUTION IRR 1000ML 0.9% NACL PLASTIC POUR BTL ISTNC N-PYRG

## (undated) DEVICE — FOOTPRINTER ID 4.5INX2 38IN PLASTIC NEO STKBL EDGE NTOX THK

## (undated) DEVICE — SUTURE VICRYL 2-0 SH 27IN BRAID COAT ABS UNDYED J417H

## (undated) DEVICE — SHEET TRNSF 1960X760MM 480MM MAXITUBE FLITES PRM TUBE LF

## (undated) DEVICE — CANISTER SCT 90D 3000ML DISP LF MDVC GUARDIAN LOCK LID OVFLW

## (undated) DEVICE — TUBING SCT CLR 12FT 316IN MDVC MAXI-GRIP NCDTV MALE TO MALE

## (undated) DEVICE — SUTURE MONOCRYL MTPS 4-0 PS2 18IN MONO ABS UNDYED

## (undated) DEVICE — SYRINGE 25GA 58IN 1ML REG WALL HPO DTCH NDL REG BVL VSB

## (undated) DEVICE — GLOVE SURG 6.5 PROTEXIS LF CRM PF BEAD CUFF STRL PLISPRN

## (undated) DEVICE — DEVICE BLD TRNSF VCTNR VNS FEMALE LL ADPR PREASSEMBLE

## (undated) DEVICE — STRIP 4X.5IN STRSTRP IPHR POLY POR ADH REINFORCE

## (undated) DEVICE — PAD PREP LG 3X2.5IN 70% ISO ALC 2 PLY ANSEP ABS STRL LF DISP

## (undated) DEVICE — PAD ABD 9X5IN CELLULOSE ABS NONWOVEN HDRPHB BACK SEAL EDGE

## (undated) DEVICE — DIAPER 1 HUGGIES SUPREME LTTL SNUGGLERS INF LF DISP

## (undated) DEVICE — SUTURE VICRYL 0 CT1 36IN BRAID COAT ABS VIOL J346H

## (undated) DEVICE — ELECTRODE ESURG 360D BLADE PNCL 10FT 38IN 78IN ADPR RADOPQ

## (undated) DEVICE — ADHESIVE PREMIERPRO EXOFIN 1ML HVISC TUBE SOFT FLXB APL

## (undated) DEVICE — SPONGE GAUZE 2X2IN RAYON POLY 4 PLY HI ABS NONWOVEN STRL LF

## (undated) DEVICE — SUTURE PDS2 0 CTX 60IN MONO LOOP ABS VIOL

## (undated) DEVICE — GLOVE SURG 6 PROTEXIS LF CRM PF BEAD CUFF STRL PLISPRN 11.5

## (undated) DEVICE — SUTURE 2-0 CT1 27IN PLN MONO ABS YELLOWISH TAN 843H

## (undated) DEVICE — SPONGE LAPAROTOMY 18X18IN STERILE 5 PK

## (undated) DEVICE — PEN SURGMRK DEVON SKIN DISP REG TIP RULER CAP GENTIAN VIOL

## (undated) DEVICE — PEN SURGMRK DEVON SKIN DISP NONSMEAR REG TIP FLXB RULER LBL

## (undated) DEVICE — GLOVE SURG 7.5 PROTEXIS LF CRM PF SMTH BEAD CUFF STRL

## (undated) DEVICE — CUFF CMPR CALF VENAFLOW ELITE SGL HOSE REPEOCESSED

## (undated) DEVICE — PAD DRSG 8X3IN CRD POLY CTN ABS PERFORATE FILM LF STRL

## (undated) DEVICE — 2% CHLORHEXIDINE SKIN PREP ORANGE 26ML

## (undated) DEVICE — SYRINGE 18GA 1.5IN 3ML BLUNT FILL NDL CONC TIP LL HUB

## (undated) DEVICE — PROBE TEMP OHMEDA OMNIBED GIRA

## (undated) DEVICE — SPONGE GAUZE 4X4IN CTN 16 PLY WOVEN FOLD EDGE STRL LF

## (undated) DEVICE — SUTURE VICRYL 3-0 CT1 36IN BRAID COAT ABS VIOL J344H

## (undated) DEVICE — TRAY CATH CMP CR LUBRI-SIL IC STLK SURESTEP 16FR FOLEY URMTR

## (undated) DEVICE — NEEDLE FLTR 18GA 1.5IN 5UM REG WALL BLUNT BVL LL HUB DEHP-FR